# Patient Record
Sex: MALE | Race: BLACK OR AFRICAN AMERICAN | NOT HISPANIC OR LATINO | Employment: FULL TIME | URBAN - METROPOLITAN AREA
[De-identification: names, ages, dates, MRNs, and addresses within clinical notes are randomized per-mention and may not be internally consistent; named-entity substitution may affect disease eponyms.]

---

## 2017-01-18 DIAGNOSIS — M47.819 SPONDYLOARTHRITIS: Primary | ICD-10-CM

## 2017-01-18 DIAGNOSIS — H20.9 UVEITIS: ICD-10-CM

## 2017-01-18 DIAGNOSIS — Z79.899 HIGH RISK MEDICATION USE: ICD-10-CM

## 2017-01-18 NOTE — TELEPHONE ENCOUNTER
Humira pen      Last Written Prescription Date:  10/27/2016  Last Fill Quantity: 2 ea,   # refills: 2  Last Office Visit with [facility], [facility] or [facility] prescribing provider: 11/23/2016  Future Office visit:    Next 5 appointments (look out 90 days)     Feb 01, 2017  9:15 AM   Return Visit with Weston Delgado MD   [facility] ([address] [address]   [phone]                   Routing refill request to provider for review/approval because:  Drug not on the [facility], [facility] or [facility] refill protocol or controlled substance

## 2017-02-01 ENCOUNTER — TELEPHONE (OUTPATIENT)
Dept: RHEUMATOLOGY | Facility: CLINIC | Age: 27
End: 2017-02-01

## 2017-02-01 DIAGNOSIS — M47.819 SPONDYLOARTHRITIS: Primary | ICD-10-CM

## 2017-02-01 DIAGNOSIS — H20.9 UVEITIS: ICD-10-CM

## 2017-02-01 DIAGNOSIS — Z79.899 HIGH RISK MEDICATION USE: ICD-10-CM

## 2017-02-01 NOTE — TELEPHONE ENCOUNTER
Reason for Call:  Other prescription    Detailed comments: Patient called to report that he still hasn't received his medication SCHUYLER MCCLELLAND      Phone Number Patient can be reached at: Home number on file [phone] (home)    Best Time: as soon as possible    Can we leave a detailed message on this number? YES    Call taken on 2/1/2017 at 3:24 PM by Karen De La Paz

## 2017-08-10 ENCOUNTER — OFFICE VISIT (OUTPATIENT)
Dept: OPHTHALMOLOGY | Facility: CLINIC | Age: 27
End: 2017-08-10
Payer: MEDICAID

## 2017-08-10 DIAGNOSIS — H21.42: Primary | ICD-10-CM

## 2017-08-10 DIAGNOSIS — H20.9 IRITIS: ICD-10-CM

## 2017-08-10 PROCEDURE — 92012 INTRM OPH EXAM EST PATIENT: CPT | Performed by: OPHTHALMOLOGY

## 2017-08-10 RX ORDER — PREDNISOLONE ACETATE 10 MG/ML
1 SUSPENSION/ DROPS OPHTHALMIC
Qty: 1 BOTTLE | Refills: 6 | Status: SHIPPED | OUTPATIENT
Start: 2017-08-10 | End: 2017-09-01

## 2017-08-10 ASSESSMENT — EXTERNAL EXAM - LEFT EYE: OS_EXAM: NORMAL

## 2017-08-10 ASSESSMENT — SLIT LAMP EXAM - LIDS
COMMENTS: NORMAL
COMMENTS: NORMAL

## 2017-08-10 ASSESSMENT — EXTERNAL EXAM - RIGHT EYE: OD_EXAM: NORMAL

## 2017-08-10 ASSESSMENT — TONOMETRY
OD_IOP_MMHG: 09
OS_IOP_MMHG: 09
IOP_METHOD: APPLANATION

## 2017-08-10 ASSESSMENT — VISUAL ACUITY
OD_CC: 20/20
METHOD: SNELLEN - LINEAR
CORRECTION_TYPE: GLASSES

## 2017-08-10 NOTE — PATIENT INSTRUCTIONS
Prednisolone: one drop both eyes every 2 hours while awake.  Return visit Wed. @ 8:30 for PI laser left eye.     Weston Delgado M.D.

## 2017-08-10 NOTE — MR AVS SNAPSHOT
After Visit Summary   8/10/2017    Kirill Butt    MRN: [id number]           Patient Information     Date Of Birth          1990        Visit Information        Provider Department      8/10/2017 9:45 AM Weston Delgado MD [facility]        Today's Diagnoses     Iritis, ou          Care Instructions    Prednisolone: one drop both eyes every 2 hours while awake  Return visit Wed. @ 8:30 for PI laser left eye     Weston Delgado M.D.            Follow-ups after your visit        Your next 10 appointments already scheduled     Aug 15, 2017 11:00 AM CDT   New Visit with Jose Alejandro Butler MD   [facility]IC[facility] ([facility] [place])    [address]  [facility] [address] [phone]           Please inform patient of the clinic address: [facility] [address] , [address] [facility] [address]              Who to contact     If you have questions or need follow up information about today's clinic visit or your schedule please contact [facility] directly at [phone].  Normal or non-critical lab and imaging results will be communicated to you by MyChart, letter or phone within 4 business days after the clinic has received the results. If you do not hear from us within 7 days, please contact the clinic through Vitrinepixhart or phone. If you have a critical or abnormal lab result, we will notify you by phone as soon as possible.  Submit refill requests through Inaika or call your pharmacy and they will forward the refill request to us. Please allow 3 business days for your refill to be completed.          Additional Information About Your Visit        MyChart Information     Inaika gives you secure access to your electronic health record. If you see a primary care provider, you can also send messages to your care team and make appointments. If you have questions, please call  your primary care clinic.  If you do not have a primary care provider, please call [phone] and they will assist you.        Care EveryWhere ID     This is your Care EveryWhere ID. This could be used by other organizations to access your [facility] medical records  [id number]         Blood Pressure from Last 3 Encounters:   08/31/16 118/80   03/18/16 136/65   11/24/15 145/89    Weight from Last 3 Encounters:   08/31/16 71.4 kg (157 lb 6.4 oz)   03/18/16 75.8 kg (167 lb)   11/24/15 74.4 kg (164 lb)              Today, you had the following     No orders found for display       Primary Care Provider Office Phone # Fax #    Rosemarie Brooks [phone]       [address]  [facility] [address]        Equal Access to Services     [facility]: Hadii aad ku hadasho Soomaali, waaxda luqadaha, qaybta kaalmada adeegyada, erick garrett hayjas mckeon . So [facility] [phone].    ATENCIÓN: Si habla español, tiene a banks disposición servicios gratuitos de asistencia lingüística. Llame al [phone].    We comply with applicable federal civil rights laws and [place] laws. We do not discriminate on the basis of race, color, national origin, age, disability sex, sexual orientation or gender identity.            Thank you!     Thank you for choosing [facility]  for your care. Our goal is always to provide you with excellent care. Hearing back from our patients is one way we can continue to improve our services. Please take a few minutes to complete the written survey that you may receive in the mail after your visit with us. Thank you!             Your Updated Medication List - Protect others around you: Learn how to safely use, store and throw away your medicines at www.disposemymeds.org.          This list is accurate as of: 8/10/17 10:44 AM.  Always use your most recent med list.                   Brand Name Dispense Instructions for use Diagnosis    adalimumab 40 MG/0.8ML pen kit     HUMIRA PEN    2 each    Inject 0.8 mLs (40 mg) Subcutaneous every 14 days    Spondyloarthritis (H), High risk medication use, Uveitis       prednisoLONE acetate 1 % ophthalmic susp    PRED FORTE    1 Bottle    Place 1 drop into both eyes 4 times daily    Iritis

## 2017-08-10 NOTE — PROGRESS NOTES
Current Eye Medications:  None.       Subjective:  Patient complains of pain in each eye.  Starting intermittently for the past month, his left eye has been hurting.  The pain has gradually increased over the past week.  Now right eye is becoming red and painful.  Vision is blurred and both eyes are photophobic.     Off Humira.   Encouraged to resume; seeing rheumatology in [place] soon.     Objective:  See Ophthalmology Exam.       Assessment:  Iritis right eye in better seeing eye of patient with hx of chronic uveitis.  Iris bombe left eye.       Plan:  Prednisolone: one drop both eyes every 2 hours while awake.  Return visit Wed. @ 8:30 for PI laser left eye.     Weston Delgado M.D.

## 2017-08-12 PROBLEM — H21.42: Status: ACTIVE | Noted: 2017-08-12

## 2017-08-16 ENCOUNTER — OFFICE VISIT (OUTPATIENT)
Dept: OPHTHALMOLOGY | Facility: CLINIC | Age: 27
End: 2017-08-16
Payer: MEDICAID

## 2017-08-16 DIAGNOSIS — H21.42: Primary | ICD-10-CM

## 2017-08-16 PROCEDURE — 66761 REVISION OF IRIS: CPT | Mod: LT | Performed by: OPHTHALMOLOGY

## 2017-08-16 ASSESSMENT — TONOMETRY
OS_IOP_MMHG: 06
IOP_METHOD: APPLANATION

## 2017-08-16 ASSESSMENT — SLIT LAMP EXAM - LIDS
COMMENTS: NORMAL
COMMENTS: NORMAL

## 2017-08-16 ASSESSMENT — EXTERNAL EXAM - LEFT EYE: OS_EXAM: NORMAL

## 2017-08-16 ASSESSMENT — VISUAL ACUITY
METHOD: SNELLEN - LINEAR
OS_SC: HM BARELY

## 2017-08-16 ASSESSMENT — EXTERNAL EXAM - RIGHT EYE: OD_EXAM: NORMAL

## 2017-08-16 NOTE — MR AVS SNAPSHOT
After Visit Summary   8/16/2017    Kirill Butt    MRN: [id number]           Patient Information     Date Of Birth          1990        Visit Information        Provider Department      8/16/2017 8:30 AM Weston Delgado MD [facility]        Today's Diagnoses     Iris bombe of left eye    -  1      Care Instructions    Continue Prednisolone every 2 hours while awake both eyes     Weston Delgado M.D.                 Follow-ups after your visit        Additional Services     OPHTHALMOLOGY ADULT REFERRAL       Your provider has referred you to: [facility], Dr. Aburto   Please be aware that coverage of these services is subject to the terms and limitations of your health insurance plan.  Call member services at your health plan with any benefit or coverage questions.      Please bring the following to your appointment:  >>   Any x-rays, CTs or MRIs which have been performed.  Contact the facility where they were done to arrange for  prior to your scheduled appointment.  Any new CT, MRI or other procedures ordered by your specialist must be performed at a [facility] facility or coordinated by your clinic's referral office.    >>   List of current medications   >>   This referral request   >>   Any documents/labs given to you for this referral                  Your next 10 appointments already scheduled     Aug 23, 2017  9:15 AM CDT   [facility]    [address] [place] [address] [phone]            Sep 05, 2017 10:45 AM CDT   New Visit with Jose Alejandro Butler MD   [facility] ([facility] [place])    [address] [place] [address], [facility] [address]  [facility] [address] [phone]           Please inform patient of the clinic address: [facility] [place] [address], [address] [facility] [address]              Who  to contact     If you have questions or need follow up information about today's clinic visit or your schedule please contact [facility] directly at [phone].  Normal or non-critical lab and imaging results will be communicated to you by MyChart, letter or phone within 4 business days after the clinic has received the results. If you do not hear from us within 7 days, please contact the clinic through MyChart or phone. If you have a critical or abnormal lab result, we will notify you by phone as soon as possible.  Submit refill requests through Biart or call your pharmacy and they will forward the refill request to us. Please allow 3 business days for your refill to be completed.          Additional Information About Your Visit        Biart Information     Biart gives you secure access to your electronic health record. If you see a primary care provider, you can also send messages to your care team and make appointments. If you have questions, please call your primary care clinic.  If you do not have a primary care provider, please call [phone] and they will assist you.        Care EveryWhere ID     This is your Care EveryWhere ID. This could be used by other organizations to access your [facility] medical records  [id number]         Blood Pressure from Last 3 Encounters:   08/31/16 118/80   03/18/16 136/65   11/24/15 145/89    Weight from Last 3 Encounters:   08/31/16 71.4 kg (157 lb 6.4 oz)   03/18/16 75.8 kg (167 lb)   11/24/15 74.4 kg (164 lb)              We Performed the Following     LASER IRIDOTOMY     OPHTHALMOLOGY ADULT REFERRAL        Primary Care Provider Office Phone # Fax #    Rosemarie Brooks [phone]       [address]  [facility] [address]        Equal Access to Services     [place] DELEON : Reese Kenney, waluisa hull, erick jean. So [place] [phone].    ATENCIÓN: Isidra sequeira  español, tiene a banks disposición servicios gratuitos de asistencia lingüística. Mitra jimenes [phone].    We comply with applicable federal civil rights laws and [place] laws. We do not discriminate on the basis of race, color, national origin, age, disability sex, sexual orientation or gender identity.            Thank you!     Thank you for choosing [facility]  for your care. Our goal is always to provide you with excellent care. Hearing back from our patients is one way we can continue to improve our services. Please take a few minutes to complete the written survey that you may receive in the mail after your visit with us. Thank you!             Your Updated Medication List - Protect others around you: Learn how to safely use, store and throw away your medicines at www.disposemymeds.org.          This list is accurate as of: 8/16/17  9:22 AM.  Always use your most recent med list.                   Brand Name Dispense Instructions for use Diagnosis    adalimumab 40 MG/0.8ML pen kit    HUMIRA PEN    2 each    Inject 0.8 mLs (40 mg) Subcutaneous every 14 days    Spondyloarthritis (H), High risk medication use, Uveitis       * prednisoLONE acetate 1 % ophthalmic susp    PRED FORTE    1 Bottle    Place 1 drop into both eyes 4 times daily    Iritis       * prednisoLONE acetate 1 % ophthalmic susp    PRED FORTE    1 Bottle    Place 1 drop into both eyes every 2 hours (while awake)    Iritis       * Notice:  This list has 2 medication(s) that are the same as other medications prescribed for you. Read the directions carefully, and ask your doctor or other care provider to review them with you.

## 2017-08-16 NOTE — PROGRESS NOTES
Current Eye Medications:  Prednisolone every 2 hours while awake both eyes      Subjective:  Yag PI os   Consent signed.     Objective:  See Ophthalmology Exam.       Assessment:  Iris bombe left eye.      Plan: Yag PI left eye  performed without problems left eye under Proparacaine anesthetic.  Well tolerated.  Number of applications: 28/Double Burst  Power of applications: 7.3 mJ  Iopidine given.    Weston Delgado M.D.

## 2017-08-18 PROBLEM — Z98.890 HISTORY OF YAG LASER IRIDOTOMY OF LEFT EYE: Status: ACTIVE | Noted: 2017-08-18

## 2017-09-01 ENCOUNTER — OFFICE VISIT (OUTPATIENT)
Dept: OPHTHALMOLOGY | Facility: CLINIC | Age: 27
End: 2017-09-01
Payer: COMMERCIAL

## 2017-09-01 DIAGNOSIS — H30.20 INTERMEDIATE UVEITIS, UNSPECIFIED LATERALITY: ICD-10-CM

## 2017-09-01 DIAGNOSIS — H20.9 IRITIS: ICD-10-CM

## 2017-09-01 PROCEDURE — 92012 INTRM OPH EXAM EST PATIENT: CPT | Performed by: OPHTHALMOLOGY

## 2017-09-01 RX ORDER — ATROPINE SULFATE 10 MG/ML
1 SOLUTION/ DROPS OPHTHALMIC 3 TIMES DAILY
Qty: 5 ML | Refills: 4 | Status: SHIPPED | OUTPATIENT
Start: 2017-09-01 | End: 2017-11-20

## 2017-09-01 RX ORDER — PREDNISOLONE ACETATE 10 MG/ML
1 SUSPENSION/ DROPS OPHTHALMIC
Qty: 1 BOTTLE | Refills: 6 | Status: SHIPPED | OUTPATIENT
Start: 2017-09-01 | End: 2017-09-21

## 2017-09-01 RX ORDER — PREDNISONE 10 MG/1
20 TABLET ORAL 2 TIMES DAILY
Qty: 60 TABLET | Refills: 3 | Status: SHIPPED | OUTPATIENT
Start: 2017-09-01 | End: 2021-07-07

## 2017-09-01 ASSESSMENT — TONOMETRY
OS_IOP_MMHG: 12
OD_IOP_MMHG: 11
IOP_METHOD: APPLANATION

## 2017-09-01 ASSESSMENT — VISUAL ACUITY
OD_SC: 20/30
OD_SC+: -2
OS_SC: LP
METHOD: SNELLEN - LINEAR

## 2017-09-01 ASSESSMENT — SLIT LAMP EXAM - LIDS
COMMENTS: NORMAL
COMMENTS: NORMAL

## 2017-09-01 ASSESSMENT — EXTERNAL EXAM - LEFT EYE: OS_EXAM: NORMAL

## 2017-09-01 ASSESSMENT — EXTERNAL EXAM - RIGHT EYE: OD_EXAM: NORMAL

## 2017-09-01 NOTE — PATIENT INSTRUCTIONS
Continue Prednisolone four times daily right eye and every 2 hours left eye   Atropine left eye three times daily.  Prednisone 20 mg. Twice daily   Return visit 2 weeks for refraction right eye and intraocular pressure check both eyes.  Try to resume Humira with rheumatology.     Weston Delgado M.D.

## 2017-09-01 NOTE — MR AVS SNAPSHOT
After Visit Summary   9/1/2017    Kirill Butt    MRN: [id number]           Patient Information     Date Of Birth          1990        Visit Information        Provider Department      9/1/2017 10:15 AM Weston Delgado MD [facility]        Today's Diagnoses     Iritis, ou        Intermediate uveitis, unspecified laterality        Iritis          Care Instructions    Continue Prednisolone four times daily right eye and every 2 hours left eye   Atropine left eye three times daily  Prednisone 20 mg. Twice daily   Return visit 2 weeks for refraction right eye and intraocular pressure check both eyes.  Try to resume Humira with rheumatology.     Weston Delgado M.D.            Follow-ups after your visit        Your next 10 appointments already scheduled     Sep 05, 2017 10:45 AM CDT   New Visit with Jose Alejandro Butler MD   [facility] MEDICINE ([facility] [place])    [address] [place] [address] [phone]           Please inform patient of the clinic address: [facility] [address] , [place] [id number]              Who to contact     If you have questions or need follow up information about today's clinic visit or your schedule please contact [facility] directly at [phone].  Normal or non-critical lab and imaging results will be communicated to you by MyChart, letter or phone within 4 business days after the clinic has received the results. If you do not hear from us within 7 days, please contact the clinic through MyChart or phone. If you have a critical or abnormal lab result, we will notify you by phone as soon as possible.  Submit refill requests through Incomparable Things or call your pharmacy and they will forward the refill request to us. Please allow 3 business days for your refill to be completed.          Additional Information About Your Visit         TM3 Software Information     TM3 Software gives you secure access to your electronic health record. If you see a primary care provider, you can also send messages to your care team and make appointments. If you have questions, please call your primary care clinic.  If you do not have a primary care provider, please call [phone] and they will assist you.        Care EveryWhere ID     This is your Care EveryWhere ID. This could be used by other organizations to access your [facility] medical records  [id number]         Blood Pressure from Last 3 Encounters:   08/31/16 118/80   03/18/16 136/65   11/24/15 145/89    Weight from Last 3 Encounters:   08/31/16 71.4 kg (157 lb 6.4 oz)   03/18/16 75.8 kg (167 lb)   11/24/15 74.4 kg (164 lb)              Today, you had the following     No orders found for display         Today's Medication Changes          These changes are accurate as of: 9/1/17 11:03 AM.  If you have any questions, ask your nurse or doctor.               Start taking these medicines.        Dose/Directions    atropine 1 % ophthalmic solution   Used for:  Iritis        Dose:  1 drop   Place 1 drop Into the left eye 3 times daily   Quantity:  5 mL   Refills:  4       predniSONE 10 MG tablet   Commonly known as:  DELTASONE   Used for:  Intermediate uveitis, unspecified laterality        Dose:  20 mg   Take 2 tablets (20 mg) by mouth 2 times daily   Quantity:  60 tablet   Refills:  3            Where to get your medicines      These medications were sent to [id number] - [facility] [address] [facility] [address] [place] [facility]  [address], [facility] [address]     Phone:  [phone]     atropine 1 % ophthalmic solution    prednisoLONE acetate 1 % ophthalmic susp    predniSONE 10 MG tablet                Primary Care Provider Office Phone # Fax #    Rosemarie Brooks [phone] [address]  [facility] [address]        Equal Access to  Services     [facility]: Hadii aad ku hadbakarishanae Sangeethagraham, waaxda luqadaha, qaybta kaalmada wei, erick cmkeon . So [facility] [phone].    ATENCIÓN: Si fabby savage, tiene a banks disposición servicios gratuitos de asistencia lingüística. Llame al [phone].    We comply with applicable federal civil rights laws and [place] laws. We do not discriminate on the basis of race, color, national origin, age, disability sex, sexual orientation or gender identity.            Thank you!     Thank you for choosing [facility]  for your care. Our goal is always to provide you with excellent care. Hearing back from our patients is one way we can continue to improve our services. Please take a few minutes to complete the written survey that you may receive in the mail after your visit with us. Thank you!             Your Updated Medication List - Protect others around you: Learn how to safely use, store and throw away your medicines at www.disposemymeds.org.          This list is accurate as of: 9/1/17 11:03 AM.  Always use your most recent med list.                   Brand Name Dispense Instructions for use Diagnosis    adalimumab 40 MG/0.8ML pen kit    HUMIRA PEN    2 each    Inject 0.8 mLs (40 mg) Subcutaneous every 14 days    Spondyloarthritis (H), High risk medication use, Uveitis       atropine 1 % ophthalmic solution     5 mL    Place 1 drop Into the left eye 3 times daily    Iritis       prednisoLONE acetate 1 % ophthalmic susp    PRED FORTE    1 Bottle    Place 1 drop into both eyes every 2 hours (while awake)    Iritis       predniSONE 10 MG tablet    DELTASONE    60 tablet    Take 2 tablets (20 mg) by mouth 2 times daily    Intermediate uveitis, unspecified laterality

## 2017-09-01 NOTE — PROGRESS NOTES
Current Eye Medications:  Prednisolone 1% both eyes every 2 hours.       Subjective:  Status/Post Yag peripheral iridotomy, left eye: 8-16-17.  Left eye was feeling better until the past few days when his left eye has been progressively very photophobic, red, and painful.     Has appointment with rheumatology soon.      Objective:  See Ophthalmology Exam.       Assessment:  Increased inflammation left eye in patient with chronic iritis and recent Yag PI for iris bombe.      Plan:  Continue Prednisolone four times daily right eye and every 2 hours left eye   Atropine left eye three times daily.  Prednisone 20 mg. Twice daily   Return visit 2 weeks for refraction right eye and intraocular pressure check both eyes.  Try to resume Humira with rheumatology.     Weston Delgado M.D.

## 2017-09-05 ENCOUNTER — OFFICE VISIT (OUTPATIENT)
Dept: RHEUMATOLOGY | Facility: CLINIC | Age: 27
End: 2017-09-05
Payer: COMMERCIAL

## 2017-09-05 VITALS
HEART RATE: 68 BPM | BODY MASS INDEX: 24.12 KG/M2 | WEIGHT: 154 LBS | DIASTOLIC BLOOD PRESSURE: 72 MMHG | SYSTOLIC BLOOD PRESSURE: 136 MMHG

## 2017-09-05 DIAGNOSIS — Z79.899 HIGH RISK MEDICATION USE: ICD-10-CM

## 2017-09-05 DIAGNOSIS — H20.9 UVEITIS: ICD-10-CM

## 2017-09-05 DIAGNOSIS — M47.819 SPONDYLOARTHRITIS: ICD-10-CM

## 2017-09-05 PROCEDURE — 99214 OFFICE O/P EST MOD 30 MIN: CPT | Performed by: INTERNAL MEDICINE

## 2017-09-05 NOTE — NURSING NOTE
"Chief Complaint   Patient presents with     Consult     Dr. Rodriguez Rheum. RA       Initial /72  Pulse 68  Wt 69.9 kg (154 lb)  BMI 24.12 kg/m2 Estimated body mass index is 24.12 kg/(m^2) as calculated from the following:    Height as of 8/31/16: 1.702 m (5' 7\").    Weight as of this encounter: 69.9 kg (154 lb).      Patient prefers to be contacted via at Home.   Okay to leave detailed message: Yes  Patient uses MyChart: Yes    Marleny AHUJA LPN    "

## 2017-09-05 NOTE — PROGRESS NOTES
PRIMARY CARE PHYSICIAN:  Dr. Rosemarie Brooks.       SUBJECTIVE:  Kirill Butt is seen to establish care for treatment of what appears to be spondyloarthropathy with iritis; was seen by Dr. Rodriguez on several occasions and was on Humira and prior to that was seen by Dr. Lee.  He apparently lost his insurance about 6 months ago and has only just regained it; hence has not been on Humira for 5 months.  He has been having recurrent attacks of iritis with recurrent attacks of pain and swelling in his right knee.  He has had this problem since age 7 and the knee has been drained on multiple occasions.      He is also worried about STDs and says that he is usually screen for that every year and is due again.      He is not having any chest pain or shortness of breath.  He denies having dysuria or hematuria.  There is no discomfort with urination.  There is no chronic diarrhea or blood in his stool.      PAST MEDICAL HISTORY:  Spinal arthritis, pseudophakia, uveitis, history vitrectomy.      MEDICATIONS:  Pred Forte.      DRUG ALLERGIES:  None.      SOCIAL HISTORY:  Works in the [employer]; is not a smoker or drinker.      FAMILY HISTORY:  Nobody in his family with similar problems.      PHYSICAL EXAMINATION:   VITAL SIGNS:  Blood pressure 136/72, pulse 68.     HEENT:  He is normocephalic and atraumatic.  Sclerae are clear and moist.  Oropharynx without lesions.   NECK:  Supple without lymphadenopathy.   LUNGS:  Clear.   HEART:  Regular without murmur.   JOINT EXAM:  No obvious synovitis, erythema of the wrists, MCPs, or PIPs.  There is mild deformity of the right third finger, perhaps of the right wrist.  No synovitis of the elbows, shoulders, knees or ankles.   SKIN:  There are no obvious lesions today.  Sclerae are clear today as well.      IMPRESSION:     1.  Recurrent iritis.   2.  Spondyloarthropathy syndrome that is probably psoriatic arthritis.      RECOMMENDATIONS:   1.  Get back on the Humira.   2.  We will  check STD labs today at his request.   3.  Follow up with me in 2-3 months to assess response to therapy.         RODRI SANCHEZ MD             D: 2017 12:38   T: 2017 12:52   MT: FRANCIA#[id number]      Name:     NADIR MCGOVERN   MRN:      [id number]        Account:      [id number]   :      1990           Visit Date:   2017      Document: [id number]       cc: Rosemarie Brooks MD

## 2017-09-05 NOTE — MR AVS SNAPSHOT
After Visit Summary   9/5/2017    Kirill Butt    MRN: [id number]           Patient Information     Date Of Birth          1990        Visit Information        Provider Department      9/5/2017 10:45 AM Jose Alejandro Butler MD [facility], [place]        Today's Diagnoses     Spondyloarthritis (H)        High risk medication use        Uveitis           Follow-ups after your visit        Follow-up notes from your care team     Return in about 2 months (around 11/5/2017).      Future tests that were ordered for you today     Open Future Orders        Priority Expected Expires Ordered    Neisseria gonorrhoeae PCR Routine  9/29/2017 9/5/2017    **HIV Antigen Antibody Combo FUTURE anytime Routine 9/5/2017 9/5/2018 9/5/2017    Anti Treponema Routine  9/5/2018 9/5/2017            Who to contact     If you have questions or need follow up information about today's clinic visit or your schedule please contact [facility], [place] directly at [phone].  Normal or non-critical lab and imaging results will be communicated to you by [employer], letter or phone within 4 business days after the clinic has received the results. If you do not hear from us within 7 days, please contact the clinic through [facility] or phone. If you have a critical or abnormal lab result, we will notify you by phone as soon as possible.  Submit refill requests through [facility] or call your pharmacy and they will forward the refill request to us. Please allow 3 business days for your refill to be completed.          Additional Information About Your Visit        [employer] Information     [facility] gives you secure access to your electronic health record. If you see a primary care provider, you can also send messages to your care team and make appointments. If you have questions, please call your primary care clinic.  If you do not have a primary care provider, please call [phone] and they will assist you.         Care EveryWhere ID     This is your Care EveryWhere ID. This could be used by other organizations to access your [facility] medical records  [id number]        Your Vitals Were     Pulse BMI (Body Mass Index)                68 24.12 kg/m2           Blood Pressure from Last 3 Encounters:   09/05/17 136/72   08/31/16 118/80   03/18/16 136/65    Weight from Last 3 Encounters:   09/05/17 69.9 kg (154 lb)   08/31/16 71.4 kg (157 lb 6.4 oz)   03/18/16 75.8 kg (167 lb)              We Performed the Following     Chlamydia trachomatis PCR          Today's Medication Changes          These changes are accurate as of: 9/5/17 10:58 AM.  If you have any questions, ask your nurse or doctor.               Start taking these medicines.        Dose/Directions    adalimumab 40 MG/0.8ML pen kit   Commonly known as:  HUMIRA PEN   Used for:  Spondyloarthritis (H), High risk medication use, Uveitis        Dose:  40 mg   Inject 0.8 mLs (40 mg) Subcutaneous every 14 days   Quantity:  2 each   Refills:  2            Where to get your medicines      These medications were sent to [facility] - [place], [place] - [address]  [address] [facility] [address]    Hours:  Mon-Fri 8:30am-5:00pm Toll Free [phone] Phone:  [phone]     adalimumab 40 MG/0.8ML pen kit                Primary Care Provider Office Phone # Fax #    Rosemarie Brooks [phone] [address]  [facility] [address]        Equal Access to Services     [facility]: Hadkarlos ashton Sograham, waaxda luqadaha, qaybta kaalmada erick carvajal . So [facility] [phone].    ATENCIÓN: Si habla español, tiene a banks disposición servicios gratuitos de asistencia lingüística. Llame al [phone].    We comply with applicable federal civil rights laws and [place] laws. We do not discriminate on the basis of race, color, national origin, age, disability sex, sexual  orientation or gender identity.            Thank you!     Thank you for choosing [facility]  for your care. Our goal is always to provide you with excellent care. Hearing back from our patients is one way we can continue to improve our services. Please take a few minutes to complete the written survey that you may receive in the mail after your visit with us. Thank you!             Your Updated Medication List - Protect others around you: Learn how to safely use, store and throw away your medicines at www.disposemymeds.org.          This list is accurate as of: 9/5/17 10:58 AM.  Always use your most recent med list.                   Brand Name Dispense Instructions for use Diagnosis    adalimumab 40 MG/0.8ML pen kit    HUMIRA PEN    2 each    Inject 0.8 mLs (40 mg) Subcutaneous every 14 days    Spondyloarthritis (H), High risk medication use, Uveitis       atropine 1 % ophthalmic solution     5 mL    Place 1 drop Into the left eye 3 times daily    Iritis       prednisoLONE acetate 1 % ophthalmic susp    PRED FORTE    1 Bottle    Place 1 drop into both eyes every 2 hours (while awake)    Iritis       predniSONE 10 MG tablet    DELTASONE    60 tablet    Take 2 tablets (20 mg) by mouth 2 times daily    Intermediate uveitis, unspecified laterality

## 2017-09-16 ENCOUNTER — HOSPITAL ENCOUNTER (EMERGENCY)
Facility: CLINIC | Age: 27
Discharge: HOME | End: 2017-09-16
Attending: INTERNAL MEDICINE | Admitting: INTERNAL MEDICINE
Payer: COMMERCIAL

## 2017-09-16 ENCOUNTER — APPOINTMENT (OUTPATIENT)
Dept: CT IMAGING | Facility: CLINIC | Age: 27
End: 2017-09-16
Attending: INTERNAL MEDICINE
Payer: COMMERCIAL

## 2017-09-16 VITALS
SYSTOLIC BLOOD PRESSURE: 121 MMHG | BODY MASS INDEX: 23.59 KG/M2 | DIASTOLIC BLOOD PRESSURE: 70 MMHG | OXYGEN SATURATION: 99 % | TEMPERATURE: 97.8 F | HEART RATE: 87 BPM | WEIGHT: 150.6 LBS | RESPIRATION RATE: 18 BRPM

## 2017-09-16 DIAGNOSIS — R51.9 SEVERE HEADACHE: ICD-10-CM

## 2017-09-16 DIAGNOSIS — R19.7 DIARRHEA, UNSPECIFIED TYPE: ICD-10-CM

## 2017-09-16 LAB
ALBUMIN SERPL-MCNC: 3.6 G/DL (ref 3.4–5)
ALP SERPL-CCNC: 87 U/L (ref 40–150)
ALT SERPL W P-5'-P-CCNC: 18 U/L (ref 0–70)
ANION GAP SERPL CALCULATED.3IONS-SCNC: 8 MMOL/L (ref 3–14)
APPEARANCE CSF: CLEAR
AST SERPL W P-5'-P-CCNC: 10 U/L (ref 0–45)
BASOPHILS # BLD AUTO: 0 10E9/L (ref 0–0.2)
BASOPHILS NFR BLD AUTO: 0.1 %
BILIRUB SERPL-MCNC: 0.3 MG/DL (ref 0.2–1.3)
BUN SERPL-MCNC: 20 MG/DL (ref 7–30)
CALCIUM SERPL-MCNC: 9.2 MG/DL (ref 8.5–10.1)
CHLORIDE SERPL-SCNC: 104 MMOL/L (ref 94–109)
CO2 SERPL-SCNC: 27 MMOL/L (ref 20–32)
COLOR CSF: COLORLESS
CREAT SERPL-MCNC: 0.87 MG/DL (ref 0.66–1.25)
CRP SERPL-MCNC: 16 MG/L (ref 0–8)
DIFFERENTIAL METHOD BLD: ABNORMAL
EOSINOPHIL # BLD AUTO: 0 10E9/L (ref 0–0.7)
EOSINOPHIL NFR BLD AUTO: 0 %
ERYTHROCYTE [DISTWIDTH] IN BLOOD BY AUTOMATED COUNT: 15.4 % (ref 10–15)
ERYTHROCYTE [SEDIMENTATION RATE] IN BLOOD BY WESTERGREN METHOD: 23 MM/H (ref 0–15)
GFR SERPL CREATININE-BSD FRML MDRD: >90 ML/MIN/1.7M2
GLUCOSE CSF-MCNC: 66 MG/DL (ref 40–70)
GLUCOSE SERPL-MCNC: 82 MG/DL (ref 70–99)
GRAM STN SPEC: NORMAL
GRAM STN SPEC: NORMAL
HCT VFR BLD AUTO: 39.2 % (ref 40–53)
HGB BLD-MCNC: 12.5 G/DL (ref 13.3–17.7)
IMM GRANULOCYTES # BLD: 0 10E9/L (ref 0–0.4)
IMM GRANULOCYTES NFR BLD: 0.3 %
INR PPP: 1 (ref 0.86–1.14)
LACTATE BLD-SCNC: 1.3 MMOL/L (ref 0.7–2)
LYMPHOCYTES # BLD AUTO: 1.7 10E9/L (ref 0.8–5.3)
LYMPHOCYTES NFR BLD AUTO: 10.8 %
MAGNESIUM SERPL-MCNC: 1.7 MG/DL (ref 1.6–2.3)
MCH RBC QN AUTO: 26.4 PG (ref 26.5–33)
MCHC RBC AUTO-ENTMCNC: 31.9 G/DL (ref 31.5–36.5)
MCV RBC AUTO: 83 FL (ref 78–100)
MONOCYTES # BLD AUTO: 0.8 10E9/L (ref 0–1.3)
MONOCYTES NFR BLD AUTO: 4.7 %
NEUTROPHILS # BLD AUTO: 13.3 10E9/L (ref 1.6–8.3)
NEUTROPHILS NFR BLD AUTO: 84.1 %
NRBC # BLD AUTO: 0 10*3/UL
NRBC BLD AUTO-RTO: 0 /100
PLATELET # BLD AUTO: 195 10E9/L (ref 150–450)
POTASSIUM SERPL-SCNC: 3.7 MMOL/L (ref 3.4–5.3)
PROT CSF-MCNC: 31 MG/DL (ref 15–60)
PROT SERPL-MCNC: 8.3 G/DL (ref 6.8–8.8)
RBC # BLD AUTO: 4.74 10E12/L (ref 4.4–5.9)
RBC # CSF MANUAL: 0 /UL (ref 0–2)
SODIUM SERPL-SCNC: 138 MMOL/L (ref 133–144)
SPECIMEN SOURCE: NORMAL
TUBE # CSF: 4 #
WBC # BLD AUTO: 15.8 10E9/L (ref 4–11)
WBC # CSF MANUAL: 0 /UL (ref 0–5)

## 2017-09-16 PROCEDURE — 85610 PROTHROMBIN TIME: CPT | Performed by: INTERNAL MEDICINE

## 2017-09-16 PROCEDURE — 87529 HSV DNA AMP PROBE: CPT | Performed by: INTERNAL MEDICINE

## 2017-09-16 PROCEDURE — 96361 HYDRATE IV INFUSION ADD-ON: CPT | Mod: 59

## 2017-09-16 PROCEDURE — 85652 RBC SED RATE AUTOMATED: CPT | Performed by: INTERNAL MEDICINE

## 2017-09-16 PROCEDURE — 82945 GLUCOSE OTHER FLUID: CPT | Performed by: INTERNAL MEDICINE

## 2017-09-16 PROCEDURE — 62270 DX LMBR SPI PNXR: CPT

## 2017-09-16 PROCEDURE — 99285 EMERGENCY DEPT VISIT HI MDM: CPT | Mod: 25

## 2017-09-16 PROCEDURE — 87070 CULTURE OTHR SPECIMN AEROBIC: CPT | Performed by: INTERNAL MEDICINE

## 2017-09-16 PROCEDURE — 87205 SMEAR GRAM STAIN: CPT | Performed by: INTERNAL MEDICINE

## 2017-09-16 PROCEDURE — 83605 ASSAY OF LACTIC ACID: CPT | Performed by: INTERNAL MEDICINE

## 2017-09-16 PROCEDURE — 96374 THER/PROPH/DIAG INJ IV PUSH: CPT

## 2017-09-16 PROCEDURE — 96375 TX/PRO/DX INJ NEW DRUG ADDON: CPT

## 2017-09-16 PROCEDURE — 70496 CT ANGIOGRAPHY HEAD: CPT

## 2017-09-16 PROCEDURE — 25000128 H RX IP 250 OP 636: Performed by: STUDENT IN AN ORGANIZED HEALTH CARE EDUCATION/TRAINING PROGRAM

## 2017-09-16 PROCEDURE — 89050 BODY FLUID CELL COUNT: CPT | Performed by: INTERNAL MEDICINE

## 2017-09-16 PROCEDURE — 62270 DX LMBR SPI PNXR: CPT | Mod: Z6 | Performed by: INTERNAL MEDICINE

## 2017-09-16 PROCEDURE — 99285 EMERGENCY DEPT VISIT HI MDM: CPT | Mod: 25 | Performed by: INTERNAL MEDICINE

## 2017-09-16 PROCEDURE — 87498 ENTEROVIRUS PROBE&REVRS TRNS: CPT | Performed by: INTERNAL MEDICINE

## 2017-09-16 PROCEDURE — 85025 COMPLETE CBC W/AUTO DIFF WBC: CPT | Performed by: INTERNAL MEDICINE

## 2017-09-16 PROCEDURE — 25000128 H RX IP 250 OP 636: Performed by: INTERNAL MEDICINE

## 2017-09-16 PROCEDURE — 86140 C-REACTIVE PROTEIN: CPT | Performed by: INTERNAL MEDICINE

## 2017-09-16 PROCEDURE — 84157 ASSAY OF PROTEIN OTHER: CPT | Performed by: INTERNAL MEDICINE

## 2017-09-16 PROCEDURE — 83735 ASSAY OF MAGNESIUM: CPT | Performed by: INTERNAL MEDICINE

## 2017-09-16 PROCEDURE — 80053 COMPREHEN METABOLIC PANEL: CPT | Performed by: INTERNAL MEDICINE

## 2017-09-16 RX ORDER — HYDROMORPHONE HYDROCHLORIDE 1 MG/ML
0.5 INJECTION, SOLUTION INTRAMUSCULAR; INTRAVENOUS; SUBCUTANEOUS
Status: DISCONTINUED | OUTPATIENT
Start: 2017-09-16 | End: 2017-09-17 | Stop reason: HOSPADM

## 2017-09-16 RX ORDER — NAPROXEN 500 MG/1
500 TABLET ORAL 2 TIMES DAILY PRN
Qty: 24 TABLET | Refills: 0 | Status: SHIPPED | OUTPATIENT
Start: 2017-09-16 | End: 2017-09-24

## 2017-09-16 RX ORDER — SODIUM CHLORIDE 9 MG/ML
1000 INJECTION, SOLUTION INTRAVENOUS CONTINUOUS
Status: DISCONTINUED | OUTPATIENT
Start: 2017-09-16 | End: 2017-09-17 | Stop reason: HOSPADM

## 2017-09-16 RX ORDER — IOPAMIDOL 755 MG/ML
75 INJECTION, SOLUTION INTRAVASCULAR ONCE
Status: COMPLETED | OUTPATIENT
Start: 2017-09-16 | End: 2017-09-16

## 2017-09-16 RX ORDER — ONDANSETRON 2 MG/ML
4 INJECTION INTRAMUSCULAR; INTRAVENOUS ONCE
Status: COMPLETED | OUTPATIENT
Start: 2017-09-16 | End: 2017-09-16

## 2017-09-16 RX ADMIN — HYDROMORPHONE HYDROCHLORIDE 0.5 MG: 1 INJECTION, SOLUTION INTRAMUSCULAR; INTRAVENOUS; SUBCUTANEOUS at 18:17

## 2017-09-16 RX ADMIN — ONDANSETRON 4 MG: 2 INJECTION INTRAMUSCULAR; INTRAVENOUS at 17:50

## 2017-09-16 RX ADMIN — IOPAMIDOL 75 ML: 755 INJECTION, SOLUTION INTRAVENOUS at 18:46

## 2017-09-16 RX ADMIN — SODIUM CHLORIDE 1000 ML: 9 INJECTION, SOLUTION INTRAVENOUS at 17:49

## 2017-09-16 ASSESSMENT — ENCOUNTER SYMPTOMS
WHEEZING: 0
NUMBNESS: 0
CHILLS: 0
NECK PAIN: 0
ABDOMINAL PAIN: 0
FEVER: 0
SORE THROAT: 0
WEAKNESS: 0
SHORTNESS OF BREATH: 0
CONFUSION: 0
HEADACHES: 1
EYE REDNESS: 1
NAUSEA: 1
ADENOPATHY: 0
VOMITING: 0
BACK PAIN: 0
LIGHT-HEADEDNESS: 0
DIFFICULTY URINATING: 0
COUGH: 0
DIARRHEA: 1

## 2017-09-16 NOTE — ED AVS SNAPSHOT
[facility], [place], Emergency Department    [address] [place] [address]    Phone:  [phone]                                       Kirill Butt   MRN: [id number]    Department:  [facility], Emergency Department   Date of Visit:  9/16/2017           After Visit Summary Signature Page     I have received my discharge instructions, and my questions have been answered. I have discussed any challenges I see with this plan with the nurse or doctor.    ..........................................................................................................................................  Patient/Patient Representative Signature      ..........................................................................................................................................  Patient Representative Print Name and Relationship to Patient    ..................................................               ................................................  Date                                            Time    ..........................................................................................................................................  Reviewed by Signature/Title    ...................................................              ..............................................  Date                                                            Time

## 2017-09-16 NOTE — ED PROVIDER NOTES
History     Chief Complaint   Patient presents with     Headache     HPI  Kirill Butt is a 27 year old male who presents with a throbbing occipital headache which awakened him from sleep last night at 0030 and has persisted throughout the day. 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 He has no fever, chills, sweats, neck pain or stiffness. He has no visual changes or eye pain. He has no numbness, weakness or difficulty with movement or speech. He has had 3 episodes of diarrhea and nausea. He has not had a similar headache in the past. He has no chest pain, cough, sputum, or shortness of breath. He has no joint pain or swelling.    PAST MEDICAL HISTORY:   Past Medical History:   Diagnosis Date     Ankylosing spondylitis (H)      Arthritis 10/3/2012     CARDIOVASCULAR SCREENING; LDL GOAL LESS THAN 160 9/11/2012     Cataract, mod, os 1/15/2015     Inflammatory spondylopathy (H) 2/1/2013     Iritis      Tear meniscus knee 10/17/2014     Traction detachment of retina, left eye        PAST SURGICAL HISTORY:   Past Surgical History:   Procedure Laterality Date     ARTHROSCOPY KNEE RT/LT Right 10/2014     ARTHROSCOPY KNEE WITH MEDIAL MENISCECTOMY Right 7/7/2015    Procedure: ARTHROSCOPY KNEE WITH MEDIAL MENISCECTOMY;  Surgeon: Marquis Degroot MD;  Location: [facility]     IRIDECTOMY Left 08/16/2017    left eye     PHACOEMULSIFICATION WITH STANDARD INTRAOCULAR LENS IMPLANT Left 6/2015     VITRECTOMY PARSPLANA Left 10/2015    retinectomy, macular TRD repair (JJ)       FAMILY HISTORY:   Family History   Problem Relation Age of Onset     Hypertension Maternal Grandfather      Aneurysm Father      Brain     Hypertension Mother      Hypertension Maternal Grandmother      Aneurysm Mother      Brain     Aneurysm Maternal Grandfather      Brain        SOCIAL HISTORY:   Social History   Substance Use Topics     Smoking status: Never Smoker     Smokeless tobacco: Never Used      Alcohol use No         I have reviewed the Medications, Allergies, Past Medical and Surgical History, and Social History in the Epic system.    Review of Systems   Constitutional: Negative for chills and fever.   HENT: Negative for congestion and sore throat.    Eyes: Positive for redness (chronic). Negative for visual disturbance.   Respiratory: Negative for cough, shortness of breath and wheezing.    Cardiovascular: Negative for chest pain.   Gastrointestinal: Positive for diarrhea and nausea. Negative for abdominal pain and vomiting.   Genitourinary: Negative for difficulty urinating.   Musculoskeletal: Negative for back pain and neck pain.   Skin: Negative for rash.   Neurological: Positive for headaches. Negative for weakness, light-headedness and numbness.   Hematological: Negative for adenopathy.   Psychiatric/Behavioral: Negative for confusion.       Physical Exam   BP: 124/84  Pulse: 78  Temp: 97.8  F (36.6  C)  Weight: 68.3 kg (150 lb 9.6 oz)  SpO2: 92 %  Physical Exam   Constitutional: He is oriented to person, place, and time. He appears well-developed and well-nourished. No distress.   HENT:   Head: Normocephalic and atraumatic.   Right Ear: External ear normal.   Left Ear: External ear normal.   Nose: Nose normal.   Mouth/Throat: Oropharynx is clear and moist. No oropharyngeal exudate.   Eyes: EOM are normal. Pupils are equal, round, and reactive to light. Left conjunctiva is injected.   Neck: Normal range of motion. Neck supple. No JVD present.   Cardiovascular: Normal rate, regular rhythm and normal heart sounds.  Exam reveals no friction rub.    No murmur heard.  Pulmonary/Chest: Effort normal and breath sounds normal. He has no wheezes. He has no rales.   Abdominal: Soft. Bowel sounds are normal. There is no tenderness. There is no rebound and no guarding.   Musculoskeletal: He exhibits no edema or tenderness.   Lymphadenopathy:     He has no cervical adenopathy.   Neurological: He is alert and  oriented to person, place, and time. He displays normal reflexes. No cranial nerve deficit. Coordination normal.   Skin: Skin is warm and dry. No rash noted.   Psychiatric: He has a normal mood and affect. His behavior is normal.   Nursing note and vitals reviewed.      ED Course     ED Course     Procedures        Labs/Imaging    Results for orders placed or performed during the hospital encounter of 09/16/17 (from the past 24 hour(s))   CBC with platelets differential   Result Value Ref Range    WBC 15.8 (H) 4.0 - 11.0 10e9/L    RBC Count 4.74 4.4 - 5.9 10e12/L    Hemoglobin 12.5 (L) 13.3 - 17.7 g/dL    Hematocrit 39.2 (L) 40.0 - 53.0 %    MCV 83 78 - 100 fl    MCH 26.4 (L) 26.5 - 33.0 pg    MCHC 31.9 31.5 - 36.5 g/dL    RDW 15.4 (H) 10.0 - 15.0 %    Platelet Count 195 150 - 450 10e9/L    Diff Method Automated Method     % Neutrophils 84.1 %    % Lymphocytes 10.8 %    % Monocytes 4.7 %    % Eosinophils 0.0 %    % Basophils 0.1 %    % Immature Granulocytes 0.3 %    Nucleated RBCs 0 0 /100    Absolute Neutrophil 13.3 (H) 1.6 - 8.3 10e9/L    Absolute Lymphocytes 1.7 0.8 - 5.3 10e9/L    Absolute Monocytes 0.8 0.0 - 1.3 10e9/L    Absolute Eosinophils 0.0 0.0 - 0.7 10e9/L    Absolute Basophils 0.0 0.0 - 0.2 10e9/L    Abs Immature Granulocytes 0.0 0 - 0.4 10e9/L    Absolute Nucleated RBC 0.0    Comprehensive metabolic panel   Result Value Ref Range    Sodium 138 133 - 144 mmol/L    Potassium 3.7 3.4 - 5.3 mmol/L    Chloride 104 94 - 109 mmol/L    Carbon Dioxide 27 20 - 32 mmol/L    Anion Gap 8 3 - 14 mmol/L    Glucose 82 70 - 99 mg/dL    Urea Nitrogen 20 7 - 30 mg/dL    Creatinine 0.87 0.66 - 1.25 mg/dL    GFR Estimate >90 >60 mL/min/1.7m2    GFR Estimate If Black >90 >60 mL/min/1.7m2    Calcium 9.2 8.5 - 10.1 mg/dL    Bilirubin Total 0.3 0.2 - 1.3 mg/dL    Albumin 3.6 3.4 - 5.0 g/dL    Protein Total 8.3 6.8 - 8.8 g/dL    Alkaline Phosphatase 87 40 - 150 U/L    ALT 18 0 - 70 U/L    AST 10 0 - 45 U/L   CRP inflammation    Result Value Ref Range    CRP Inflammation 16.0 (H) 0.0 - 8.0 mg/L   Erythrocyte sedimentation rate auto   Result Value Ref Range    Sed Rate 23 (H) 0 - 15 mm/h   INR   Result Value Ref Range    INR 1.00 0.86 - 1.14   Magnesium   Result Value Ref Range    Magnesium 1.7 1.6 - 2.3 mg/dL   Lactic acid   Result Value Ref Range    Lactic Acid 1.3 0.7 - 2.0 mmol/L   CT Head Angio w/o & w Contrast    Narrative    CTA ANGIOGRAM HEAD 9/16/2017 7:01 PM    Head CT without contrast  CT angiogram of the neck   CT angiogram of the base of the brain with contrast  Reconstruction by the Radiologist on the 3D workstation    Provided History:  severe occipital headache family history of  aneurisms    Comparison:  MRI 9/12/2012.      Technique:  HEAD CT:  Using multidetector thin collimation helical acquisition  technique, axial, coronal and sagittal CT images from the skull base  to the vertex were obtained without intravenous contrast.   HEAD and NECK CTA: During rapid bolus intravenous injection of  nonionic contrast material, axial images were obtained using thin  collimation multidetector helical technique from the base of the skull  through the [place]. This CT angiogram data was reconstructed  at thin intervals with mild overlap. Images were sent to the [employer]a  workstation, and 3D reconstructions were obtained. The axial source  images, multiplanar reformations, 3D reconstructions in both maximum  intensity projection display and volume rendered models were reviewed,  with reconstructions performed by the technologist and the  radiologist.    Contrast: 75ml     Findings:  Head CT: There is no evidence of intracranial hemorrhage, mass effect,  or midline shift. Gray/white matter differentiation in both cerebral  hemispheres is preserved. Ventricles are proportionate to the cerebral  sulci.     Paranasal sinuses and mastoid air cells are clear.    Postsurgical changes of left vitrectomy.    Head CTA demonstrates  no aneurysm or stenosis of the major  intracranial arteries. The anterior communicating artery is patent.  The posterior communicating arteries are patent bilaterally.     Neck CTA demonstrates no stenosis of the major cervical arteries. The  origins of the great vessels from the aortic arch are patent.    No mass within the visualized portions of the cervical soft tissues or  lung apices.       Impression    Impression:    1. Head CTA demonstrates no aneurysm or stenosis of the major  intracranial arteries.   2. Neck CTA demonstrates no stenosis of the major cervical arteries.   3. No intracranial hemorrhage on the noncontrast head CT.  4. Post surgical changes of left vitrectomy.    I have personally reviewed the examination and initial interpretation  and I agree with the findings.    AMARA LOVE MD   Gram stain   Result Value Ref Range    Specimen Description Cerebrospinal fluid TUBE 3     Gram Stain No organisms seen     Gram Stain No  WBC'S seen      CSF Culture Aerobic Bacterial   Result Value Ref Range    Specimen Description Cerebrospinal fluid TUBE 3     Culture Micro PENDING    Cell count with differential CSF: Tube 4   Result Value Ref Range    WBC CSF 0 0 - 5 /uL    RBC CSF 0 0 - 2 /uL    Tube Number 4 #    Color CSF Colorless CLRL^Colorless    Appearance CSF Clear CLER^Clear     Discussed results of the labs and head CTA with the patient and his family. I discussed that the elevated WBC may be due to prednisone, but that infection remains a concern with this severe headache. Discussed risks of LP including post LP headache, infection, nerve injury, bleeding. He is agreeable to testing.    [facility] Procedure Note          Lumbar Puncture:      Time: 8:41 PM  Performed by: PRABHU TEIXEIRA  Authorized by: PRABHU TEIXEIRA    Indications: headache    Consent given by: Patient who states understanding of the procedure being performed after discussing the risks, benefits and  alternatives.    Prior to the start of the procedure and with procedural staff participation, I verbally confirmed the patient s identity using two indicators, relevant allergies, that the procedure was appropriate and matched the consent or emergent situation, and that the correct equipment/implants were available. Immediately prior to starting the procedure I conducted the Time Out with the procedural staff and re-confirmed the patient s name, procedure, and site/side. (The Joint Commission universal protocol was followed.) Yes    Under sterile conditions the patient was positioned Sitting, bent forward. Betadine solution and sterile drapes were utilized.  Local anesthetic at the site: 2 ml of lidocaine 1% without epinephrine from the LP tray  A 21 G  spinal needle was inserted at the L 3-4 interspace.  Opening Pressurewas not checked.  A total of 7mL of clear and colorless spinal fluid was obtained and sent to the laboratory.   After the needle was removed, a bandaid and pressure were applied and the patient was instructed to stay horizontal until the results were back.    Complications:  None    Patient tolerance: Patient tolerated the procedure well with no immediate complications.    2225: Headache resolved. No further nausea or diarrhea.    Assessments & Plan (with Medical Decision Making)   Impression:  Young man with a history of recurrent iritis and rheumatoid positive arthropathy. He is currently on oral prednisone for a flare up of iritis. He presented with throbbing occipital headache which awakened him from sleep, mild diarrhea this morning (resolved) and nausea. He has been having some neck aches in the mornings depending on how he sleeps. He has known cervical arthropathy. He has not had similar headaches in the past. He has a family history of intracranial aneurisms. He had a negative MR angiogram in 2012. Today he has a normal head CT and CT angiogram. He has an elevated WBC. He has been on a  prednisone burst for a couple of weeks. Since infectious process could not be excluded based on labs and exam he consented to LP. This had 0 WBC and 0 RBC's and negative gram stain. Thus meningitis, encephalitis and hemorrhage are extremely unlikely. The cause of his headache is not entirely clear. With the associated GI symptoms viral syndrome, atypical migraine are possible as is headache related to cervical arthritis. Other than the elevated WBC, labs are fairly unremarkable. Inflammatory markers are mildly elevated. Electrolytes and LFT are normal. He was treated with IV fluids, zofran and a single dose of 0.5 mg dilaudid with full resolution of the symptoms.      I have reviewed the nursing notes.    I have reviewed the findings, diagnosis, plan and need for follow up with the patient.    New Prescriptions    NAPROXEN (NAPROSYN) 500 MG TABLET    Take 1 tablet (500 mg) by mouth 2 times daily as needed for moderate pain       Final diagnoses:   Severe headache   Diarrhea, unspecified type       9/16/2017   [facility], [place], EMERGENCY DEPARTMENT     Ovidio Jaimes MD  09/16/17 9705

## 2017-09-16 NOTE — ED NOTES
Patient arrived for a headache that woke him up during the night. He reports diarrhea and pressure in his head associated with this headache. His parents have a history of brain aneurysms. He is alert, oriented, ambulatory. He says this has never happened to him before.

## 2017-09-16 NOTE — LETTER
To Whom it may concern:      Kirill Butt was seen in our Emergency Department today, 09/16/17.  I expect his condition to improve over the next few days.  he may return to work/school when improved.    Sincerely,  PRABHU TEIXEIRA MD

## 2017-09-16 NOTE — ED AVS SNAPSHOT
[facility], Emergency Department    [address], [place] [address]    Phone:  [phone]                                       Kirill Butt   MRN: [id number]    Department:  [facility], Emergency Department   Date of Visit:  9/16/2017           Patient Information     Date Of Birth          1990        Your diagnoses for this visit were:     Severe headache     Diarrhea, unspecified type        You were seen by Ovidio Jaimes MD.        Discharge Instructions       Tylenol or Naproxen as needed for pain.  Return for worsening headaches, fever, nausea, vomiting, new or worsening symptoms.  Follow up with your regular eye doctor and rheumatologist.    24 Hour Appointment Hotline       To make an appointment at any [facility] clinic, call [phone] ([phone]). If you don't have a family doctor or clinic, we will help you find one. [facility] clinics are conveniently located to serve the needs of you and your family.             Review of your medicines      START taking        Dose / Directions Last dose taken    naproxen 500 MG tablet   Commonly known as:  NAPROSYN   Dose:  500 mg   Quantity:  24 tablet        Take 1 tablet (500 mg) by mouth 2 times daily as needed for moderate pain   Refills:  0          Our records show that you are taking the medicines listed below. If these are incorrect, please call your family doctor or clinic.        Dose / Directions Last dose taken    adalimumab 40 MG/0.8ML pen kit   Commonly known as:  HUMIRA PEN   Dose:  40 mg   Quantity:  2 each        Inject 0.8 mLs (40 mg) Subcutaneous every 14 days   Refills:  2        atropine 1 % ophthalmic solution   Dose:  1 drop   Quantity:  5 mL        Place 1 drop Into the left eye 3 times daily   Refills:  4        prednisoLONE acetate 1 % ophthalmic susp   Commonly known as:  PRED FORTE   Dose:  1 drop   Quantity:  1 Bottle        Place 1 drop into both eyes every 2 hours (while awake)   Refills:  6         predniSONE 10 MG tablet   Commonly known as:  DELTASONE   Dose:  20 mg   Quantity:  60 tablet        Take 2 tablets (20 mg) by mouth 2 times daily   Refills:  3                Prescriptions were sent or printed at these locations (1 Prescription)                   Other Prescriptions                Printed at Department/Unit printer (1 of 1)         naproxen (NAPROSYN) 500 MG tablet                Procedures and tests performed during your visit     CBC with platelets differential    CRP inflammation    CSF Culture Aerobic Bacterial    CT Head Angio w/o & w Contrast    Cell count with differential CSF: Tube 4    Comprehensive metabolic panel    Enterovirus PCR CSF    Erythrocyte sedimentation rate auto    Glucose CSF: Tube 2    Gram stain    HSV Types 1 and 2 Qualitative PCR CSF: Tube 3    INR    Lactic acid    Magnesium    Protein total CSF: Tube 2      Orders Needing Specimen Collection     None      Pending Results     Date and Time Order Name Status Description    9/16/2017 2040 HSV Types 1 and 2 Qualitative PCR CSF: Tube 3 In process     9/16/2017 2040 Enterovirus PCR CSF In process     9/16/2017 2040 CSF Culture Aerobic Bacterial Preliminary     9/16/2017 2040 Protein total CSF: Tube 2 In process     9/16/2017 2040 Glucose CSF: Tube 2 In process             Pending Culture Results     Date and Time Order Name Status Description    9/16/2017 2040 HSV Types 1 and 2 Qualitative PCR CSF: Tube 3 In process     9/16/2017 2040 Enterovirus PCR CSF In process     9/16/2017 2040 CSF Culture Aerobic Bacterial Preliminary     9/16/2017 2040 Protein total CSF: Tube 2 In process     9/16/2017 2040 Glucose CSF: Tube 2 In process             Pending Results Instructions     If you had any lab results that were not finalized at the time of your Discharge, you can call the ED Lab Result RN at [phone]. You will be contacted by this team for any positive Lab results or changes in treatment. The nurses are available 7 days  a week from 10A to 6:30P.  You can leave a message 24 hours per day and they will return your call.        Thank you for choosing [facility]       Thank you for choosing [facility] for your care. Our goal is always to provide you with excellent care. Hearing back from our patients is one way we can continue to improve our services. Please take a few minutes to complete the written survey that you may receive in the mail after you visit with us. Thank you!        [employer] Information     [employer] gives you secure access to your electronic health record. If you see a primary care provider, you can also send messages to your care team and make appointments. If you have questions, please call your primary care clinic.  If you do not have a primary care provider, please call [phone] and they will assist you.        Care EveryWhere ID     This is your Care EveryWhere ID. This could be used by other organizations to access your [facility] medical records  [id number]        Equal Access to Services     ALBANIA DELEON : Reese Kenney, esteban hull, ninoska carvajal, erick mckeon . So [facility] [phone].    ATENCIÓN: Si habla español, tiene a banks disposición servicios gratuitos de asistencia lingüística. Lljuancho al [phone].    We comply with applicable federal civil rights laws and [place] laws. We do not discriminate on the basis of race, color, national origin, age, disability sex, sexual orientation or gender identity.            After Visit Summary       This is your record. Keep this with you and show to your community pharmacist(s) and doctor(s) at your next visit.

## 2017-09-17 LAB
EV RNA SPEC QL NAA+PROBE: NEGATIVE
HSV1 DNA CSF QL NAA+PROBE: NOT DETECTED
HSV2 DNA CSF QL NAA+PROBE: NOT DETECTED
MICROBIOLOGIST REVIEW: NORMAL
SPECIMEN SOURCE: NORMAL

## 2017-09-17 NOTE — DISCHARGE INSTRUCTIONS
Tylenol or Naproxen as needed for pain.  Return for worsening headaches, fever, nausea, vomiting, new or worsening symptoms.  Follow up with your regular eye doctor and rheumatologist.

## 2017-09-21 DIAGNOSIS — H20.9 IRITIS: ICD-10-CM

## 2017-09-21 LAB
BACTERIA SPEC CULT: NO GROWTH
SPECIMEN SOURCE: NORMAL

## 2017-09-21 RX ORDER — PREDNISOLONE ACETATE 10 MG/ML
1 SUSPENSION/ DROPS OPHTHALMIC
Qty: 1 BOTTLE | Refills: 6 | Status: SHIPPED | OUTPATIENT
Start: 2017-09-21 | End: 2017-11-20

## 2017-09-21 NOTE — TELEPHONE ENCOUNTER
Per Dr. Delgado, patient is to be taking Prednisolone QID right eye and q2h left eye. Refilled to [facility] on [address] for patient.

## 2017-09-26 DIAGNOSIS — Z79.899 HIGH RISK MEDICATION USE: ICD-10-CM

## 2017-09-26 LAB — HIV 1+2 AB+HIV1 P24 AG SERPL QL IA: NONREACTIVE

## 2017-09-26 PROCEDURE — 87591 N.GONORRHOEAE DNA AMP PROB: CPT | Performed by: INTERNAL MEDICINE

## 2017-09-26 PROCEDURE — 36415 COLL VENOUS BLD VENIPUNCTURE: CPT | Performed by: INTERNAL MEDICINE

## 2017-09-26 PROCEDURE — 86780 TREPONEMA PALLIDUM: CPT | Performed by: INTERNAL MEDICINE

## 2017-09-26 PROCEDURE — 87389 HIV-1 AG W/HIV-1&-2 AB AG IA: CPT | Performed by: INTERNAL MEDICINE

## 2017-09-27 LAB
N GONORRHOEA DNA SPEC QL NAA+PROBE: NEGATIVE
SPECIMEN SOURCE: NORMAL
T PALLIDUM IGG+IGM SER QL: NEGATIVE

## 2017-11-20 ENCOUNTER — TELEPHONE (OUTPATIENT)
Dept: OPHTHALMOLOGY | Facility: CLINIC | Age: 27
End: 2017-11-20

## 2017-11-20 DIAGNOSIS — H20.9 IRITIS: ICD-10-CM

## 2017-11-20 RX ORDER — ATROPINE SULFATE 10 MG/ML
1 SOLUTION/ DROPS OPHTHALMIC 3 TIMES DAILY
Qty: 5 ML | Refills: 2 | Status: SHIPPED | OUTPATIENT
Start: 2017-11-20 | End: 2018-12-05

## 2017-11-20 RX ORDER — PREDNISOLONE ACETATE 10 MG/ML
1 SUSPENSION/ DROPS OPHTHALMIC
Qty: 1 BOTTLE | Refills: 3 | Status: SHIPPED | OUTPATIENT
Start: 2017-11-20 | End: 2018-03-21

## 2017-11-20 NOTE — TELEPHONE ENCOUNTER
Reason for call:  Other   Patient called regarding (reason for call): prescription  Additional comments: Both eye drops he would like a refill for sent to the pharmacy on file for him      Phone number to reach patient:  Cell number on file:    Telephone Information:   Mobile [phone]       Best Time:  Any     Can we leave a detailed message on this number?  YES

## 2018-03-21 DIAGNOSIS — H20.9 IRITIS: ICD-10-CM

## 2018-03-21 RX ORDER — PREDNISOLONE ACETATE 10 MG/ML
1 SUSPENSION/ DROPS OPHTHALMIC
Qty: 1 BOTTLE | Refills: 0 | Status: SHIPPED | OUTPATIENT
Start: 2018-03-21 | End: 2018-11-29

## 2018-03-21 NOTE — TELEPHONE ENCOUNTER
Refilling Prednisolone Per Dr. Danny Roy should be seen for further refills. Patient states going to schedule appointment when new insurance kicks in 2 weeks from now.

## 2018-04-18 ENCOUNTER — OFFICE VISIT (OUTPATIENT)
Dept: OPHTHALMOLOGY | Facility: CLINIC | Age: 28
End: 2018-04-18
Payer: COMMERCIAL

## 2018-04-18 DIAGNOSIS — H44.40 HYPOTONY OF LEFT EYE: ICD-10-CM

## 2018-04-18 DIAGNOSIS — H21.42: Primary | ICD-10-CM

## 2018-04-18 DIAGNOSIS — H20.9 IRITIS: ICD-10-CM

## 2018-04-18 DIAGNOSIS — H33.42 TRACTION DETACHMENT OF RETINA, LEFT EYE: ICD-10-CM

## 2018-04-18 DIAGNOSIS — H30.22 INTERMEDIATE UVEITIS, LEFT: ICD-10-CM

## 2018-04-18 PROCEDURE — 92012 INTRM OPH EXAM EST PATIENT: CPT | Performed by: OPHTHALMOLOGY

## 2018-04-18 ASSESSMENT — SLIT LAMP EXAM - LIDS
COMMENTS: NORMAL
COMMENTS: NORMAL

## 2018-04-18 ASSESSMENT — REFRACTION_MANIFEST
OD_AXIS: 090
OD_SPHERE: +1.00
OD_CYLINDER: +1.25

## 2018-04-18 ASSESSMENT — VISUAL ACUITY
METHOD: SNELLEN - LINEAR
OD_CC: 20/20
CORRECTION_TYPE: GLASSES

## 2018-04-18 ASSESSMENT — REFRACTION_WEARINGRX
OS_CYLINDER: SPHERE
OD_SPHERE: +0.50
OD_CYLINDER: +0.50
OD_AXIS: 092
OS_SPHERE: PLANO
SPECS_TYPE: SVL

## 2018-04-18 ASSESSMENT — TONOMETRY
IOP_METHOD: APPLANATION
OS_IOP_MMHG: 01
OD_IOP_MMHG: 07

## 2018-04-18 ASSESSMENT — EXTERNAL EXAM - LEFT EYE: OS_EXAM: NORMAL

## 2018-04-18 ASSESSMENT — EXTERNAL EXAM - RIGHT EYE: OD_EXAM: NORMAL

## 2018-04-18 NOTE — MR AVS SNAPSHOT
After Visit Summary   4/18/2018    Kirill Butt    MRN: [id number]           Patient Information     Date Of Birth          1990        Visit Information        Provider Department      4/18/2018 10:45 AM Weston Delgado MD [facility]        Today's Diagnoses     Iris bombe of left eye    -  1    Iritis, ou        Traction detachment of retina, left eye        Intermediate uveitis, left        Vitritis OS        High risk medication use          Care Instructions    Continue same medication.  Call if left eye becomes painful.  Schedule an appointment with Dr. Aburto.  Glasses Rx given - optional  Follow up with Rheumatology.  Return visit 4 months for an intraocular pressure check.  Weston Delgado M.D.  [phone]            Follow-ups after your visit        Additional Services     OPHTHALMOLOGY ADULT REFERRAL       Your provider has referred you to:  [facility]    Please be aware that coverage of these services is subject to the terms and limitations of your health insurance plan.  Call member services at your health plan with any benefit or coverage questions.      Please bring the following to your appointment:  >>   Any x-rays, CTs or MRIs which have been performed.  Contact the facility where they were done to arrange for  prior to your scheduled appointment.  Any new CT, MRI or other procedures ordered by your specialist must be performed at a [facility] facility or coordinated by your clinic's referral office.    >>   List of current medications   >>   This referral request   >>   Any documents/labs given to you for this referral                  Who to contact     If you have questions or need follow up information about today's clinic visit or your schedule please contact [facility] directly at [phone].  Normal or non-critical lab and imaging results will be communicated to you by MyChart, letter or phone within 4 business days  after the clinic has received the results. If you do not hear from us within 7 days, please contact the clinic through [employer] or phone. If you have a critical or abnormal lab result, we will notify you by phone as soon as possible.  Submit refill requests through [employer] or call your pharmacy and they will forward the refill request to us. Please allow 3 business days for your refill to be completed.          Additional Information About Your Visit        [employer] Information     [employer] gives you secure access to your electronic health record. If you see a primary care provider, you can also send messages to your care team and make appointments. If you have questions, please call your primary care clinic.  If you do not have a primary care provider, please call [phone] and they will assist you.        Care EveryWhere ID     This is your Care EveryWhere ID. This could be used by other organizations to access your [facility] medical records  [id number]         Blood Pressure from Last 3 Encounters:   09/16/17 121/70   09/05/17 136/72   08/31/16 118/80    Weight from Last 3 Encounters:   09/16/17 68.3 kg (150 lb 9.6 oz)   09/05/17 69.9 kg (154 lb)   08/31/16 71.4 kg (157 lb 6.4 oz)              We Performed the Following     OPHTHALMOLOGY ADULT REFERRAL        Primary Care Provider Fax #    Provider Not In System [phone]                Equal Access to Services     ALBANIA DELEON : Hadii amarilis ku hadasho Soomaali, waaxda luqadaha, qaybta kaalmada adekalyn, erick goodman. So [facility] [phone].    ATENCIÓN: Si habla español, tiene a banks disposición servicios gratuitos de asistencia lingüística. Llame al [phone].    We comply with applicable federal civil rights laws and [place] laws. We do not discriminate on the basis of race, color, national origin, age, disability, sex, sexual orientation, or gender identity.            Thank you!     Thank you for choosing [facility]  [facility]  for your care. Our goal is always to provide you with excellent care. Hearing back from our patients is one way we can continue to improve our services. Please take a few minutes to complete the written survey that you may receive in the mail after your visit with us. Thank you!             Your Updated Medication List - Protect others around you: Learn how to safely use, store and throw away your medicines at www.disposemymeds.org.          This list is accurate as of 4/18/18 12:16 PM.  Always use your most recent med list.                   Brand Name Dispense Instructions for use Diagnosis    adalimumab 40 MG/0.8ML pen kit    HUMIRA PEN    2 each    Inject 0.8 mLs (40 mg) Subcutaneous every 14 days    Spondyloarthritis (H), High risk medication use, Uveitis       atropine 1 % ophthalmic solution     5 mL    Place 1 drop Into the left eye 3 times daily    Iritis       prednisoLONE acetate 1 % ophthalmic susp    PRED FORTE    1 Bottle    Place 1 drop into both eyes every 2 hours (while awake)    Iritis       predniSONE 10 MG tablet    DELTASONE    60 tablet    Take 2 tablets (20 mg) by mouth 2 times daily    Intermediate uveitis, unspecified laterality

## 2018-04-18 NOTE — PATIENT INSTRUCTIONS
Continue same medication.  Call if left eye becomes painful.  Schedule an appointment with Dr. Aburto.  Glasses Rx given - optional  Follow up with Rheumatology.  Return visit 4 months for an intraocular pressure check.  Weston Delgado M.D.  [phone]

## 2018-04-18 NOTE — PROGRESS NOTES
Current Eye Medications:  Prednisolone 1 % 2-3 times a day.  Atropine as needed left eye for pain.     Subjective:  6 mo recheck iritis left eye.  Pt reports the condition of his left remains about the same, continues to have some light sensitivity and vision remains very poor.    Not currently on systemic med for rheumatologic disease.  Uses atropine on occasion for pain but causes irritation.     Objective:  See Ophthalmology Exam.       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      ICD-10-CM    1. Iris bombe of left eye H21.42    2. Hypotony of left eye H44.40 OPHTHALMOLOGY ADULT REFERRAL   3. Traction detachment of retina, left eye H33.42 OPHTHALMOLOGY ADULT REFERRAL     CANCELED: OPHTHALMOLOGY ADULT REFERRAL   4. Iritis, ou H20.9    5. 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  Call if left eye becomes painful.  Schedule an appointment with Dr. Aburto.  Glasses Rx given - optional  Follow up with Rheumatology.  Return visit 4 months for an intraocular pressure check.  Weston Delgado M.D.  [phone]

## 2018-04-18 NOTE — LETTER
4/18/2018         RE: Kirill Butt  [address] [address]        Dear Colleague,    Thank you for referring your patient, Kirill Butt, to the [facility]. Please see a copy of my visit note below.     Current Eye Medications:  Prednisolone 1 % 2-3 times a day.  Atropine as needed left eye for pain.     Subjective:  6 mo recheck iritis left eye.  Pt reports the condition of his left remains about the same, continues to have some light sensitivity and vision remains very poor.    Not currently on systemic med for rheumatologic disease.  Uses atropine on occasion for pain but causes irritation.     Objective:  See Ophthalmology Exam.       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      ICD-10-CM    1. Iris bombe of left eye H21.42    2. Hypotony of left eye H44.40 OPHTHALMOLOGY ADULT REFERRAL   3. Traction detachment of retina, left eye H33.42 OPHTHALMOLOGY ADULT REFERRAL     CANCELED: OPHTHALMOLOGY ADULT REFERRAL   4. Iritis, ou H20.9    5. 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  Call if left eye becomes painful.  Schedule an appointment with Dr. Aburto.  Glasses Rx given - optional  Follow up with Rheumatology.  Return visit 4 months for an intraocular pressure check.  Weston Delgado M.D.  [phone]           Again, thank you for allowing me to participate in the care of your patient.        Sincerely,        Weston Delgado MD

## 2018-05-02 ENCOUNTER — TRANSFERRED RECORDS (OUTPATIENT)
Dept: HEALTH INFORMATION MANAGEMENT | Facility: CLINIC | Age: 28
End: 2018-05-02

## 2018-09-21 ENCOUNTER — HOSPITAL ENCOUNTER (EMERGENCY)
Facility: CLINIC | Age: 28
Discharge: HOME | End: 2018-09-21
Attending: FAMILY MEDICINE | Admitting: FAMILY MEDICINE
Payer: COMMERCIAL

## 2018-09-21 ENCOUNTER — APPOINTMENT (OUTPATIENT)
Dept: ULTRASOUND IMAGING | Facility: CLINIC | Age: 28
End: 2018-09-21
Attending: FAMILY MEDICINE
Payer: COMMERCIAL

## 2018-09-21 VITALS
RESPIRATION RATE: 16 BRPM | WEIGHT: 160 LBS | OXYGEN SATURATION: 99 % | HEIGHT: 67 IN | HEART RATE: 79 BPM | SYSTOLIC BLOOD PRESSURE: 107 MMHG | BODY MASS INDEX: 25.11 KG/M2 | DIASTOLIC BLOOD PRESSURE: 68 MMHG | TEMPERATURE: 97.5 F

## 2018-09-21 DIAGNOSIS — N50.811 TESTICULAR PAIN, RIGHT: ICD-10-CM

## 2018-09-21 DIAGNOSIS — S76.211A GROIN STRAIN, RIGHT, INITIAL ENCOUNTER: ICD-10-CM

## 2018-09-21 LAB
ALBUMIN UR-MCNC: NEGATIVE MG/DL
APPEARANCE UR: CLEAR
BILIRUB UR QL STRIP: NEGATIVE
COLOR UR AUTO: NORMAL
GLUCOSE UR STRIP-MCNC: NEGATIVE MG/DL
HGB UR QL STRIP: NEGATIVE
KETONES UR STRIP-MCNC: NEGATIVE MG/DL
LEUKOCYTE ESTERASE UR QL STRIP: NEGATIVE
NITRATE UR QL: NEGATIVE
PH UR STRIP: 6.5 PH (ref 5–7)
SOURCE: NORMAL
SP GR UR STRIP: 1.01 (ref 1–1.03)
UROBILINOGEN UR STRIP-MCNC: NORMAL MG/DL (ref 0–2)

## 2018-09-21 PROCEDURE — 87591 N.GONORRHOEAE DNA AMP PROB: CPT | Performed by: FAMILY MEDICINE

## 2018-09-21 PROCEDURE — 93976 VASCULAR STUDY: CPT

## 2018-09-21 PROCEDURE — 99284 EMERGENCY DEPT VISIT MOD MDM: CPT | Mod: 25 | Performed by: FAMILY MEDICINE

## 2018-09-21 PROCEDURE — 99283 EMERGENCY DEPT VISIT LOW MDM: CPT | Mod: Z6 | Performed by: FAMILY MEDICINE

## 2018-09-21 PROCEDURE — 25000132 ZZH RX MED GY IP 250 OP 250 PS 637: Performed by: FAMILY MEDICINE

## 2018-09-21 PROCEDURE — 96372 THER/PROPH/DIAG INJ SC/IM: CPT | Performed by: FAMILY MEDICINE

## 2018-09-21 PROCEDURE — 25000128 H RX IP 250 OP 636: Performed by: FAMILY MEDICINE

## 2018-09-21 PROCEDURE — 25000125 ZZHC RX 250: Performed by: FAMILY MEDICINE

## 2018-09-21 PROCEDURE — 81003 URINALYSIS AUTO W/O SCOPE: CPT | Performed by: FAMILY MEDICINE

## 2018-09-21 PROCEDURE — 87491 CHLMYD TRACH DNA AMP PROBE: CPT | Performed by: FAMILY MEDICINE

## 2018-09-21 RX ORDER — OXYCODONE AND ACETAMINOPHEN 5; 325 MG/1; MG/1
1 TABLET ORAL ONCE
Status: COMPLETED | OUTPATIENT
Start: 2018-09-21 | End: 2018-09-21

## 2018-09-21 RX ORDER — DOXYCYCLINE 100 MG/1
100 CAPSULE ORAL 2 TIMES DAILY
Qty: 20 CAPSULE | Refills: 0 | Status: SHIPPED | OUTPATIENT
Start: 2018-09-21 | End: 2018-10-01

## 2018-09-21 RX ORDER — OXYCODONE AND ACETAMINOPHEN 5; 325 MG/1; MG/1
1 TABLET ORAL EVERY 6 HOURS PRN
Qty: 12 TABLET | Refills: 0 | Status: SHIPPED | OUTPATIENT
Start: 2018-09-21 | End: 2021-07-07

## 2018-09-21 RX ADMIN — OXYCODONE HYDROCHLORIDE AND ACETAMINOPHEN 1 TABLET: 5; 325 TABLET ORAL at 09:04

## 2018-09-21 RX ADMIN — LIDOCAINE HYDROCHLORIDE 250 MG: 10 INJECTION, SOLUTION EPIDURAL; INFILTRATION; INTRACAUDAL; PERINEURAL at 13:48

## 2018-09-21 ASSESSMENT — ENCOUNTER SYMPTOMS
ABDOMINAL PAIN: 1
FEVER: 0
NAUSEA: 0
SHORTNESS OF BREATH: 0
HEMATURIA: 0
DYSURIA: 0
WEAKNESS: 0
BACK PAIN: 0
FREQUENCY: 1
VOMITING: 0
ABDOMINAL DISTENTION: 0
ACTIVITY CHANGE: 1
DECREASED CONCENTRATION: 0
CHILLS: 0
FLANK PAIN: 0
DYSPHORIC MOOD: 0

## 2018-09-21 NOTE — ED AVS SNAPSHOT
[facility], Emergency Department    [address] [facility] [address]    Phone:  [phone]                                       Kirill Butt   MRN: [id number]    Department:  [facility], Emergency Department   Date of Visit:  9/21/2018           Patient Information     Date Of Birth          1990        Your diagnoses for this visit were:     Groin strain, right, initial encounter     Testicular pain, right        You were seen by Yunier Ocampo MD.        Discharge Instructions       Home.  Your ultrasound looks normal.   Your urine looks ok.  You may have strained abdominal wall with strain of scrotal cord.  Treat you for possible infection also.  Take the doxycycline 100mg twice a day for 10 days.  Take ibuprofen for pain.  Take percocet if needed for breakthrough pain.  Follow up with [facility] next week if not better.  REturn if any worse or concerning symptoms.    Please make an appointment to follow up with Urology Clinic (phone: [phone]) in 7 days if you have any concerns.      Testicular Pain, Unclear Cause  You have had pain in one or both testicles. Based on your exam today, the exact cause of your pain is not certain. But your condition does not appear to be dangerous. Testicles are very sensitive. Even a small injury can cause quite a bit of pain. Other possible causes of testicular pain include kidney stones, cysts, mumps, inflammatory conditions, chronic conditions, hernia, infection, and a twisted testicle.  Certain tests may be done to rule out an underlying problem causing the pain. Nothing conclusive was found today. Most likely, the pain will go away on its own. If it doesn t, you may need more tests.    Home care  Medicine may be prescribed to help relieve pain and swelling. This may be an over-the-counter pain reliever or prescription pain medication. Take all medicine as directed.  The following are general care guidelines:    To relieve pain and swelling,  apply an ice pack wrapped in a thin towel for 10 minutes at a time. Continue this on and off for 1 to 2 days.    When lying down, place a small rolled towel under your scrotum. When moving around, wear a jockstrap (athletic supporter) or supportive underwear. These will help support and protect your testicles.    If it hurts to walk, walk as little as possible until you feel better.    Avoid strenuous activity until you feel better.    Do not have sex until you feel better.    If you have severe pain in the testicle, seek care right away. Delay may lead to permanent loss of the testicle s function.  Follow-up care  Follow up with your healthcare provider, or as advised.  When to seek medical advice  Call your healthcare provider right away if any of these occur:    Fever of 100.4 F (38 C) or higher    Worsening of the pain or severe pain    Swelling of the testicle or scrotum    A lump in the scrotum    Warm and red scrotum (signs of infection)    Nausea and vomiting    Pain or swelling in abdomen    Trouble urinating    Numbness or weakness in the leg    Shrinking of the testicle    Blood in your urine  Date Last Reviewed: 10/1/2016    8045-9366 [employer]. [address] [place] [address]. All rights reserved. This information is not intended as a substitute for professional medical care. Always follow your healthcare professional's instructions.          24 Hour Appointment Hotline       To make an appointment at any [place] clinic, call [phone] ([phone]). If you don't have a family doctor or clinic, we will help you find one. [place] clinics are conveniently located to serve the needs of you and your family.             Review of your medicines      START taking        Dose / Directions Last dose taken    doxycycline 100 MG capsule   Commonly known as:  VIBRAMYCIN   Dose:  100 mg   Quantity:  20 capsule        Take 1 capsule (100 mg) by mouth 2 times daily for 10 days    Refills:  0        oxyCODONE-acetaminophen 5-325 MG per tablet   Commonly known as:  PERCOCET   Dose:  1 tablet   Quantity:  12 tablet        Take 1 tablet by mouth every 6 hours as needed for moderate to severe pain or severe pain   Refills:  0          Our records show that you are taking the medicines listed below. If these are incorrect, please call your family doctor or clinic.        Dose / Directions Last dose taken    adalimumab 40 MG/0.8ML pen kit   Commonly known as:  HUMIRA PEN   Dose:  40 mg   Quantity:  2 each        Inject 0.8 mLs (40 mg) Subcutaneous every 14 days   Refills:  2        atropine 1 % ophthalmic solution   Dose:  1 drop   Quantity:  5 mL        Place 1 drop Into the left eye 3 times daily   Refills:  2        prednisoLONE acetate 1 % ophthalmic susp   Commonly known as:  PRED FORTE   Dose:  1 drop   Quantity:  1 Bottle        Place 1 drop into both eyes every 2 hours (while awake)   Refills:  0        predniSONE 10 MG tablet   Commonly known as:  DELTASONE   Dose:  20 mg   Quantity:  60 tablet        Take 2 tablets (20 mg) by mouth 2 times daily   Refills:  3                Information about OPIOIDS     PRESCRIPTION OPIOIDS: WHAT YOU NEED TO KNOW   We gave you an opioid (narcotic) pain medicine. It is important to manage your pain, but opioids are not always the best choice. You should first try all the other options your care team gave you. Take this medicine for as short a time (and as few doses) as possible.    Some activities can increase your pain, such as bandage changes or therapy sessions. It may help to take your pain medicine 30 to 60 minutes before these activities. Reduce your stress by getting enough sleep, working on hobbies you enjoy and practicing relaxation or meditation. Talk to your care team about ways to manage your pain beyond prescription opioids.    These medicines have risks:    DO NOT drive when on new or higher doses of pain medicine. These medicines can  affect your alertness and reaction times, and you could be arrested for driving under the influence (DUI). If you need to use opioids long-term, talk to your care team about driving.    DO NOT operate heavy machinery    DO NOT do any other dangerous activities while taking these medicines.    DO NOT drink any alcohol while taking these medicines.     If the opioid prescribed includes acetaminophen, DO NOT take with any other medicines that contain acetaminophen. Read all labels carefully. Look for the word  acetaminophen  or  Tylenol.  Ask your pharmacist if you have questions or are unsure.    You can get addicted to pain medicines, especially if you have a history of addiction (chemical, alcohol or substance dependence). Talk to your care team about ways to reduce this risk.    All opioids tend to cause constipation. Drink plenty of water and eat foods that have a lot of fiber, such as fruits, vegetables, prune juice, apple juice and high-fiber cereal. Take a laxative (Miralax, milk of magnesia, Colace, Senna) if you don t move your bowels at least every other day. Other side effects include upset stomach, sleepiness, dizziness, throwing up, tolerance (needing more of the medicine to have the same effect), physical dependence and slowed breathing.    Store your pills in a secure place, locked if possible. We will not replace any lost or stolen medicine. If you don t finish your medicine, please throw away (dispose) as directed by your pharmacist. The [place] Pollution Control Agency has more information about safe disposal: https://www.pca.[facility].mn.us/living-green/managing-unwanted-medications        Prescriptions were sent or printed at these locations (2 Prescriptions)                   Other Prescriptions                Printed at Department/Unit printer (2 of 2)         doxycycline (VIBRAMYCIN) 100 MG capsule               oxyCODONE-acetaminophen (PERCOCET) 5-325 MG per tablet                Procedures and  tests performed during your visit     Chlamydia trachomatis PCR    Neisseria gonorrhoeae PCR    UA reflex to Microscopic    US Testicular & Scrotum w Doppler Ltd      Orders Needing Specimen Collection     None      Pending Results     Date and Time Order Name Status Description    9/21/2018 1022 Chlamydia trachomatis PCR In process     9/21/2018 1022 Neisseria gonorrhoeae PCR In process             Pending Culture Results     Date and Time Order Name Status Description    9/21/2018 1022 Chlamydia trachomatis PCR In process     9/21/2018 1022 Neisseria gonorrhoeae PCR In process             Pending Results Instructions     If you had any lab results that were not finalized at the time of your Discharge, you can call the ED Lab Result RN at [phone]. You will be contacted by this team for any positive Lab results or changes in treatment. The nurses are available 7 days a week from 10A to 6:30P.  You can leave a message 24 hours per day and they will return your call.        Thank you for choosing [facility]       Thank you for choosing [facility] for your care. Our goal is always to provide you with excellent care. Hearing back from our patients is one way we can continue to improve our services. Please take a few minutes to complete the written survey that you may receive in the mail after you visit with us. Thank you!        [email or web address] Information     [employer] gives you secure access to your electronic health record. If you see a primary care provider, you can also send messages to your care team and make appointments. If you have questions, please call your primary care clinic.  If you do not have a primary care provider, please call [phone] and they will assist you.        Care EveryWhere ID     This is your Care EveryWhere ID. This could be used by other organizations to access your [facility] medical records  [id number]        Equal Access to Services     [place] DELEON AH: esteban Segura  ninoska hull waxay idiin hayaan adeeg kharash la'aan ah. So [facility] [phone].    ATENCIÓN: Si habla janette, tiene a banks disposición servicios gratuitos de asistencia lingüística. Llame al [phone].    We comply with applicable federal civil rights laws and [place] laws. We do not discriminate on the basis of race, color, national origin, age, disability, sex, sexual orientation, or gender identity.            After Visit Summary       This is your record. Keep this with you and show to your community pharmacist(s) and doctor(s) at your next visit.

## 2018-09-21 NOTE — DISCHARGE INSTRUCTIONS
Home.  Your ultrasound looks normal.   Your urine looks ok.  You may have strained abdominal wall with strain of scrotal cord.  Treat you for possible infection also.  Take the doxycycline 100mg twice a day for 10 days.  Take ibuprofen for pain.  Take percocet if needed for breakthrough pain.  [facility] next week if not better.  REturn if any worse or concerning symptoms.    Please make an appointment to follow up with Urology Clinic (phone: [phone]) in 7 days if you have any concerns.      Testicular Pain, Unclear Cause  You have had pain in one or both testicles. Based on your exam today, the exact cause of your pain is not certain. But your condition does not appear to be dangerous. Testicles are very sensitive. Even a small injury can cause quite a bit of pain. Other possible causes of testicular pain include kidney stones, cysts, mumps, inflammatory conditions, chronic conditions, hernia, infection, and a twisted testicle.  Certain tests may be done to rule out an underlying problem causing the pain. Nothing conclusive was found today. Most likely, the pain will go away on its own. If it doesn t, you may need more tests.    Home care  Medicine may be prescribed to help relieve pain and swelling. This may be an over-the-counter pain reliever or prescription pain medication. Take all medicine as directed.  The following are general care guidelines:    To relieve pain and swelling, apply an ice pack wrapped in a thin towel for 10 minutes at a time. Continue this on and off for 1 to 2 days.    When lying down, place a small rolled towel under your scrotum. When moving around, wear a jockstrap (athletic supporter) or supportive underwear. These will help support and protect your testicles.    If it hurts to walk, walk as little as possible until you feel better.    Avoid strenuous activity until you feel better.    Do not have sex until you feel better.    If you have severe pain in the  testicle, seek care right away. Delay may lead to permanent loss of the testicle s function.  Follow-up care  Follow up with your healthcare provider, or as advised.  When to seek medical advice  Call your healthcare provider right away if any of these occur:    Fever of 100.4 F (38 C) or higher    Worsening of the pain or severe pain    Swelling of the testicle or scrotum    A lump in the scrotum    Warm and red scrotum (signs of infection)    Nausea and vomiting    Pain or swelling in abdomen    Trouble urinating    Numbness or weakness in the leg    Shrinking of the testicle    Blood in your urine  Date Last Reviewed: 10/1/2016    8572-3397 [employer]. [address] [place] [address], [place] [address]. All rights reserved. This information is not intended as a substitute for professional medical care. Always follow your healthcare professional's instructions.

## 2018-09-21 NOTE — ED TRIAGE NOTES
Pt presents ambulatory to triage with c/o lower abdominal pain for the past 3 days with frequent urination. Reports no relief with Ibuprofen and Tylenol. Denies N/V.

## 2018-09-21 NOTE — ED PROVIDER NOTES
[facility] EMERGENCY DEPARTMENT ([facility])  September 21, 2018    History     Chief Complaint   Patient presents with     Abdominal Pain     HPI  Kirill Butt is a 28 year old male with a history of spondyloarthritis who presents to the ED with lower abdominal pain and R testicular pain and swelling. Patient states his symptoms started a couple of days ago and did do some heavy lifting recently. He has tried taking ibuprofen and Tylenol without relief.  He also reports having urinary frequency and denies prior history of UTIs. He states he did have and STI when he was 15, but otherwise denies any fevers, chills, nausea, vomiting, prior history of hernias, or abdominal distension. Denies any fullness or hernia. No discharge etc.      PAST MEDICAL HISTORY  Past Medical History:   Diagnosis Date     Ankylosing spondylitis (H)      Arthritis 10/3/2012     CARDIOVASCULAR SCREENING; LDL GOAL LESS THAN 160 9/11/2012     Cataract, mod, os 1/15/2015     Inflammatory spondylopathy (H) 2/1/2013     Iritis      Tear meniscus knee 10/17/2014     Traction detachment of retina, left eye      PAST SURGICAL HISTORY  Past Surgical History:   Procedure Laterality Date     ARTHROSCOPY KNEE RT/LT Right 10/2014     ARTHROSCOPY KNEE WITH MEDIAL MENISCECTOMY Right 7/7/2015    Procedure: ARTHROSCOPY KNEE WITH MEDIAL MENISCECTOMY;  Surgeon: Marquis Degroot MD;  Location: [facility]     IRIDECTOMY Left 08/16/2017    left eye     PHACOEMULSIFICATION WITH STANDARD INTRAOCULAR LENS IMPLANT Left 6/2015     VITRECTOMY PARSPLANA Left 10/2015    retinectomy, macular TRD repair (JJ)     FAMILY HISTORY  Family History   Problem Relation Age of Onset     Hypertension Maternal Grandfather      Aneurysm Father      Brain     Hypertension Mother      Hypertension Maternal Grandmother      Aneurysm Mother      Brain     Aneurysm Maternal Grandfather      Brain      SOCIAL HISTORY  Social History   Substance Use Topics     Smoking  "status: Never Smoker     Smokeless tobacco: Never Used     Alcohol use No     MEDICATIONS  No current facility-administered medications for this encounter.      Current Outpatient Prescriptions   Medication     atropine 1 % ophthalmic solution     doxycycline (VIBRAMYCIN) 100 MG capsule     oxyCODONE-acetaminophen (PERCOCET) 5-325 MG per tablet     prednisoLONE acetate (PRED FORTE) 1 % ophthalmic susp     adalimumab (HUMIRA PEN) 40 MG/0.8ML pen kit     predniSONE (DELTASONE) 10 MG tablet     Facility-Administered Medications Ordered in Other Encounters   Medication     ketorolac (TORADOL) injection     ALLERGIES  No Known Allergies      I have reviewed the Medications, Allergies, Past Medical and Surgical History, and Social History in the Epic system.    Review of Systems   Constitutional: Positive for activity change. Negative for chills and fever.   Respiratory: Negative for shortness of breath.    Cardiovascular: Negative for chest pain.   Gastrointestinal: Positive for abdominal pain (lower abdominal pain). Negative for abdominal distention, nausea and vomiting.   Genitourinary: Positive for frequency, scrotal swelling (R sided ) and testicular pain (R). Negative for discharge, dysuria, flank pain and hematuria.   Musculoskeletal: Negative for back pain and gait problem.   Skin: Negative for rash.   Allergic/Immunologic: Negative for immunocompromised state.   Neurological: Negative for weakness.   Psychiatric/Behavioral: Negative for decreased concentration and dysphoric mood.   All other systems reviewed and are negative.      Physical Exam   BP: (!) 129/91  Pulse: 79  Temp: 97.5  F (36.4  C)  Resp: 16  Height: 170.2 cm (5' 7\")  Weight: 72.6 kg (160 lb) (standing scale)  SpO2: 99 %      Physical Exam   Constitutional: He is oriented to person, place, and time. He appears well-developed and well-nourished. He appears distressed.   Mild distress with pain but nontoxic. Not writhing in pain.   HENT:   Head: " Normocephalic and atraumatic.   Mouth/Throat: Oropharynx is clear and moist. No oropharyngeal exudate.   Eyes: Pupils are equal, round, and reactive to light. No scleral icterus.   Neck: Normal range of motion. Neck supple.   Cardiovascular: Normal heart sounds and intact distal pulses.    Pulmonary/Chest: Breath sounds normal. No respiratory distress.   Abdominal: Soft. Bowel sounds are normal. He exhibits no mass. There is tenderness.   Lower ab tenderness without sign of mass or fullness or hernia.   Genitourinary: No penile tenderness.   Genitourinary Comments: Right testicle and cord tender without marked swelling or sign of torsion. No hernia   Musculoskeletal: He exhibits no edema or tenderness.   Neurological: He is alert and oriented to person, place, and time. He has normal reflexes.   Skin: Skin is warm and dry. No rash noted. He is not diaphoretic. No erythema. No pallor.   Psychiatric:   appropriate   Nursing note and vitals reviewed.      ED Course     ED Course     Procedures   8:52 AM  The patient was seen and examined by Dr. Ocampo in Room 7.   Patient given percocet for pain with improvement.  UA negative.  uriprobe ordered.    Testicle us done with neg for torsion or increase flow. ? Small hydrocele noted.    Discussed with urology. Will treat for clinical epididimytis.    Ceftriaxone 250mg IM given.  Patient ok homw with treatment of infection and also trauma with Urology follow up next week.    Patient agrees with pain.                 Critical Care time:  none             Labs Ordered and Resulted from Time of ED Arrival Up to the Time of Departure from the ED   URINE MACROSCOPIC WITH REFLEX TO MICRO   NEISSERIA GONORRHOEAE PCR   CHLAMYDIA TRACHOMATIS PCR     Results for orders placed or performed during the hospital encounter of 09/21/18   US Testicular & Scrotum w Doppler Ltd    Narrative    EXAMINATION: US TESTICULAR AND SCROTAL, 9/21/2018 9:42 AM     COMPARISON: None.    HISTORY: Right  scrotal pain    TECHNIQUE: The scrotum was scanned in standard fashion with  specialized ultrasound transducer(s) using both grey scale and limited  color Doppler techniques.    Findings:  The testes demonstrate normal and symmetric echotexture and  vascularity. No evidence of a focal lesion.  The right testicle  measures 5 x 2.8 x 2.9  cm and the left measures 4.5 x 3 x 2.4 cm.  There is no evidence of torsion.    Small right hydrocele, likely reactive. There is no varicocele.    Both the right and left epididymis are within normal limits.  No hyperemia to the testes or epididymis.      Impression    Impression:   1. No sonographic or Doppler evidence of acute pathology.  2. Small right hydrocele, which is nonspecific but can be seen in the  setting of trauma.    I have personally reviewed the examination and initial interpretation  and I agree with the findings.    ANDRESSA GRIFFIN MD   UA reflex to Microscopic   Result Value Ref Range    Color Urine Light Yellow     Appearance Urine Clear     Glucose Urine Negative NEG^Negative mg/dL    Bilirubin Urine Negative NEG^Negative    Ketones Urine Negative NEG^Negative mg/dL    Specific Gravity Urine 1.014 1.003 - 1.035    Blood Urine Negative NEG^Negative    pH Urine 6.5 5.0 - 7.0 pH    Protein Albumin Urine Negative NEG^Negative mg/dL    Urobilinogen mg/dL Normal 0.0 - 2.0 mg/dL    Nitrite Urine Negative NEG^Negative    Leukocyte Esterase Urine Negative NEG^Negative    Source Midstream Urine        Consults  Urology: Responded (09/21/18 6905)    Assessments & Plan (with Medical Decision Making)  29 yo male with right testicle pain and right groin pain since heavy lifting 3 days ago. Patient eval with right testicle tenderness but no hernia noted. No hx of sti but ddx epididimyitis infect vs trauma. Treated with ceftriaxone and doxycycline with follow up urology next week. Patient agrees with plan.           I have reviewed the nursing notes.    I have reviewed the  findings, diagnosis, plan and need for follow up with the patient.    Discharge Medication List as of 9/21/2018  2:04 PM      START taking these medications    Details   doxycycline (VIBRAMYCIN) 100 MG capsule Take 1 capsule (100 mg) by mouth 2 times daily for 10 days, Disp-20 capsule, R-0, Local Print      oxyCODONE-acetaminophen (PERCOCET) 5-325 MG per tablet Take 1 tablet by mouth every 6 hours as needed for moderate to severe pain or severe pain, Disp-12 tablet, R-0, Local Print             Final diagnoses:   Groin strain, right, initial encounter   Testicular pain, right     IJj, am serving as a trained medical scribe to document services personally performed by Yunier Ocampo MD, based on the provider's statements to me.      IYunier MD, was physically present and have reviewed and verified the accuracy of this note documented by Jj Phillips.     9/21/2018   [facility], EMERGENCY DEPARTMENT      This note was created at least in part by the use of dragon voice dictation system. Inadvertent typographical errors may still exist.  Yunier Ocampo MD.         Yunier Ocampo MD  09/21/18 4551

## 2018-09-21 NOTE — ED AVS SNAPSHOT
[facility], [place], Emergency Department    [address] [place] [address]    Phone:  [phone]                                       Kirill Butt   MRN: [id number]    Department:  [facility], Emergency Department   Date of Visit:  9/21/2018           After Visit Summary Signature Page     I have received my discharge instructions, and my questions have been answered. I have discussed any challenges I see with this plan with the nurse or doctor.    ..........................................................................................................................................  Patient/Patient Representative Signature      ..........................................................................................................................................  Patient Representative Print Name and Relationship to Patient    ..................................................               ................................................  Date                                   Time    ..........................................................................................................................................  Reviewed by Signature/Title    ...................................................              ..............................................  Date                                               Time          22EPIC Rev 08/18

## 2018-09-23 LAB
C TRACH DNA SPEC QL NAA+PROBE: NEGATIVE
N GONORRHOEA DNA SPEC QL NAA+PROBE: NEGATIVE
SPECIMEN SOURCE: NORMAL
SPECIMEN SOURCE: NORMAL

## 2018-11-29 ENCOUNTER — TELEPHONE (OUTPATIENT)
Dept: OPHTHALMOLOGY | Facility: CLINIC | Age: 28
End: 2018-11-29

## 2018-11-29 DIAGNOSIS — H20.9 IRITIS: ICD-10-CM

## 2018-11-29 RX ORDER — PREDNISOLONE ACETATE 10 MG/ML
1 SUSPENSION/ DROPS OPHTHALMIC 4 TIMES DAILY
Qty: 1 BOTTLE | Refills: 3 | Status: SHIPPED | OUTPATIENT
Start: 2018-11-29 | End: 2018-12-05

## 2018-11-29 NOTE — TELEPHONE ENCOUNTER
Reason for Call:  Other prescription    Detailed comments: Patient had pharmacy fax a refill request. Patient wants to inform Dr. Hedrick he has made an appointment for 12/5/18 for a 6 month recheck. Patient would like to know if he can get a refill before being seen, please call to discuss thank you.     Phone Number Patient can be reached at: Home number on file [phone] (home)    Best Time: any    Can we leave a detailed message on this number? YES    Call taken on 11/29/2018 at 8:22 AM by JANET BOSCH

## 2018-11-29 NOTE — TELEPHONE ENCOUNTER
No future appointments scheduled.  I asked the pharmacy to remind the patient to schedule an appointment with Dr. Delgado.

## 2018-11-29 NOTE — TELEPHONE ENCOUNTER
Fax received from pharmacy requesting refill of prednisoLONE acetate (PRED FORTE) 1 % ophthalmic susp.

## 2018-12-05 ENCOUNTER — OFFICE VISIT (OUTPATIENT)
Dept: OPHTHALMOLOGY | Facility: CLINIC | Age: 28
End: 2018-12-05
Payer: COMMERCIAL

## 2018-12-05 DIAGNOSIS — M47.819 SPONDYLOARTHRITIS: ICD-10-CM

## 2018-12-05 DIAGNOSIS — H20.9 IRITIS: Primary | ICD-10-CM

## 2018-12-05 DIAGNOSIS — H30.22 INTERMEDIATE UVEITIS, LEFT: ICD-10-CM

## 2018-12-05 DIAGNOSIS — H33.42 TRACTION DETACHMENT OF RETINA, LEFT EYE: ICD-10-CM

## 2018-12-05 PROCEDURE — 92012 INTRM OPH EXAM EST PATIENT: CPT | Performed by: OPHTHALMOLOGY

## 2018-12-05 RX ORDER — PREDNISOLONE ACETATE 10 MG/ML
1 SUSPENSION/ DROPS OPHTHALMIC 4 TIMES DAILY
Qty: 1 BOTTLE | Refills: 3 | Status: SHIPPED | OUTPATIENT
Start: 2018-12-05 | End: 2019-02-12

## 2018-12-05 RX ORDER — ATROPINE SULFATE 10 MG/ML
1 SOLUTION/ DROPS OPHTHALMIC 3 TIMES DAILY
Qty: 5 ML | Refills: 2 | Status: SHIPPED | OUTPATIENT
Start: 2018-12-05 | End: 2020-01-23

## 2018-12-05 ASSESSMENT — EXTERNAL EXAM - LEFT EYE: OS_EXAM: NORMAL

## 2018-12-05 ASSESSMENT — SLIT LAMP EXAM - LIDS
COMMENTS: NORMAL
COMMENTS: NORMAL

## 2018-12-05 ASSESSMENT — TONOMETRY
OS_IOP_MMHG: 06
IOP_METHOD: ICARE
OD_IOP_MMHG: 09

## 2018-12-05 ASSESSMENT — VISUAL ACUITY
OS_CC: HM
CORRECTION_TYPE: GLASSES
OD_CC: 20/20
METHOD: SNELLEN - LINEAR

## 2018-12-05 ASSESSMENT — EXTERNAL EXAM - RIGHT EYE: OD_EXAM: NORMAL

## 2018-12-05 NOTE — LETTER
12/5/2018         RE: Kirill Butt  [address] [facility] [address]        Dear Colleague,    Thank you for referring your patient, Kirill Butt, to the [facility]. Please see a copy of my visit note below.     Current Eye Medications:  Prednisolone four times a day left eye. Ran out of atropine 1 month ago.     Subjective:  6 month Iritis follow up. Vision is OK with glasses, poor with out glasses. Patient feels he is more reliant on glasses. Left eye has a aching and burning pain (pain scale 9/10 when it is bad). When pain gets bad, patient has to sit in dark room.     Not on systemic meds.  No significant joint pain at present.  No symptoms right eye.     Objective:  See Ophthalmology Exam.       Assessment:  Moderate pain and inflammation in blind left eye off meds.      Plan:  Continue using Prednisolone left eye four times daily,  And Atropine left eye two to three times daily.  Wait at least 5 minutes between drops if using more than one at a time.  Recommend scheduling an appointment with Dr. Rodriguez.   Return visit in 6 months for complete exam.    Weston Delgado M.D.  [phone]           Again, thank you for allowing me to participate in the care of your patient.        Sincerely,        Weston Delgado MD

## 2018-12-05 NOTE — MR AVS SNAPSHOT
After Visit Summary   12/5/2018    Kirill Butt    MRN: [id number]           Patient Information     Date Of Birth          1990        Visit Information        Provider Department      12/5/2018 10:30 AM Weston Delgado MD [facility]        Today's Diagnoses     Iritis, ou          Care Instructions    Continue using Prednisolone left eye four times daily,  And Atropine left eye two to three times daily.  Wait at least 5 minutes between drops if using more than one at a time.  Recommend scheduling an appointment with Dr. Rodriguez.   Return visit in 6 months for complete exam.    Weston Delgado M.D.  [phone]            Follow-ups after your visit        Who to contact     If you have questions or need follow up information about today's clinic visit or your schedule please contact [facility] directly at [phone].  Normal or non-critical lab and imaging results will be communicated to you by MyChart, letter or phone within 4 business days after the clinic has received the results. If you do not hear from us within 7 days, please contact the clinic through [employer] or phone. If you have a critical or abnormal lab result, we will notify you by phone as soon as possible.  Submit refill requests through [employer] or call your pharmacy and they will forward the refill request to us. Please allow 3 business days for your refill to be completed.          Additional Information About Your Visit        MyChart Information     [employer] gives you secure access to your electronic health record. If you see a primary care provider, you can also send messages to your care team and make appointments. If you have questions, please call your primary care clinic.  If you do not have a primary care provider, please call [phone] and they will assist you.        Care EveryWhere ID     This is your Care EveryWhere ID. This could be used by other organizations to access your  [place] medical records  [id number]         Blood Pressure from Last 3 Encounters:   09/21/18 107/68   09/16/17 121/70   09/05/17 136/72    Weight from Last 3 Encounters:   09/21/18 72.6 kg (160 lb)   09/16/17 68.3 kg (150 lb 9.6 oz)   09/05/17 69.9 kg (154 lb)              Today, you had the following     No orders found for display         Where to get your medicines      These medications were sent to [facility] [id number] [address] AT [address]  [address] [facility] [address]     Phone:  [phone]     atropine 1 % ophthalmic solution    prednisoLONE acetate 1 % ophthalmic suspension          Primary Care Provider Fax #    Physician No Ref-Primary [phone]       No address on file        Equal Access to Services     [place] DELEON : Reese Kenney, waluisa hull, ninoska carvajal, erick mckeon . So [facility] [phone].    ATENCIÓN: Si habla español, tiene a banks disposición servicios gratuitos de asistencia lingüística. Mitra al [phone].    We comply with applicable federal civil rights laws and [place] laws. We do not discriminate on the basis of race, color, national origin, age, disability, sex, sexual orientation, or gender identity.            Thank you!     Thank you for choosing [facility]  for your care. Our goal is always to provide you with excellent care. Hearing back from our patients is one way we can continue to improve our services. Please take a few minutes to complete the written survey that you may receive in the mail after your visit with us. Thank you!             Your Updated Medication List - Protect others around you: Learn how to safely use, store and throw away your medicines at www.disposemymeds.org.          This list is accurate as of 12/5/18 11:36 AM.  Always use your most recent med list.                   Brand Name Dispense Instructions for use  Diagnosis    adalimumab 40 MG/0.8ML pen kit    HUMIRA PEN    2 each    Inject 0.8 mLs (40 mg) Subcutaneous every 14 days    Spondyloarthritis, High risk medication use, Uveitis       atropine 1 % ophthalmic solution     5 mL    Place 1 drop Into the left eye 3 times daily    Iritis       oxyCODONE-acetaminophen 5-325 MG tablet    PERCOCET    12 tablet    Take 1 tablet by mouth every 6 hours as needed for moderate to severe pain or severe pain        prednisoLONE acetate 1 % ophthalmic suspension    PRED FORTE    1 Bottle    Place 1 drop into both eyes 4 times daily    Iritis       predniSONE 10 MG tablet    DELTASONE    60 tablet    Take 2 tablets (20 mg) by mouth 2 times daily    Intermediate uveitis, unspecified laterality

## 2018-12-05 NOTE — PROGRESS NOTES
Current Eye Medications:  Prednisolone four times a day left eye. Ran out of atropine 1 month ago.     Subjective:  6 month Iritis follow up. Vision is OK with glasses, poor with out glasses. Patient feels he is more reliant on glasses. Left eye has a aching and burning pain (pain scale 9/10 when it is bad). When pain gets bad, patient has to sit in dark room.     Not on systemic meds.  No significant joint pain at present.  No symptoms right eye.     Objective:  See Ophthalmology Exam.       Assessment:  Moderate pain and inflammation in blind left eye off meds.      Plan:  Continue using Prednisolone left eye four times daily,  And Atropine left eye two to three times daily.  Wait at least 5 minutes between drops if using more than one at a time.  Recommend scheduling an appointment with Dr. Rodriguez.   Return visit in 6 months for complete exam.    Weston Delgado M.D.  [phone]

## 2018-12-05 NOTE — PATIENT INSTRUCTIONS
Continue using Prednisolone left eye four times daily,  And Atropine left eye two to three times daily.  Wait at least 5 minutes between drops if using more than one at a time.  Recommend scheduling an appointment with Dr. Rodriguez.   Return visit in 6 months for complete exam.    Weston Delgado M.D.  [phone]

## 2018-12-07 ENCOUNTER — TELEPHONE (OUTPATIENT)
Dept: OPHTHALMOLOGY | Facility: CLINIC | Age: 28
End: 2018-12-07

## 2018-12-07 DIAGNOSIS — H20.9 IRITIS: Primary | ICD-10-CM

## 2018-12-07 RX ORDER — CYCLOPENTOLATE HYDROCHLORIDE 20 MG/ML
1 SOLUTION/ DROPS OPHTHALMIC 2 TIMES DAILY
Qty: 5 ML | Refills: 3 | Status: SHIPPED | OUTPATIENT
Start: 2018-12-07 | End: 2020-01-23

## 2018-12-07 NOTE — TELEPHONE ENCOUNTER
Fax received from pharmacy stating atropine 1 % ophthalmic solution is on back order until 12/31/18.

## 2019-01-31 ENCOUNTER — DOCUMENTATION ONLY (OUTPATIENT)
Dept: OPHTHALMOLOGY | Facility: CLINIC | Age: 29
End: 2019-01-31

## 2019-02-12 ENCOUNTER — TELEPHONE (OUTPATIENT)
Dept: OPHTHALMOLOGY | Facility: CLINIC | Age: 29
End: 2019-02-12

## 2019-02-12 DIAGNOSIS — H20.9 IRITIS: ICD-10-CM

## 2019-02-12 RX ORDER — PREDNISOLONE ACETATE 10 MG/ML
1 SUSPENSION/ DROPS OPHTHALMIC 4 TIMES DAILY
Qty: 3 BOTTLE | Refills: 1 | Status: SHIPPED | OUTPATIENT
Start: 2019-02-12 | End: 2019-12-09

## 2019-02-12 NOTE — TELEPHONE ENCOUNTER
Called [facility] and they are out of Prednisolone until 3-9-19.  They have two bottles of Pred Forte, but it is set aside for someone else.    Called [facility] pharmacy across the street and they have some in stock.  Will send over Rx to them and have him pick them up.  I called patient and he will refill at [facility].

## 2019-02-12 NOTE — TELEPHONE ENCOUNTER
Reason for Call:  Other prescription    Detailed comments: patient states he is out of    prednisoLONE acetate (PRED FORTE) 1 % ophthalmic suspension     And has been to several [facility] in the area and they are stating the medication is on backorder. Patient would like a refill of this and to know where its not on backorder. Please call to discuss thank you.     Phone Number Patient can be reached at: Home number on file [phone] (home)    Best Time: any    Can we leave a detailed message on this number? YES    Call taken on 2/12/2019 at 10:17 AM by JANET BOSCH

## 2019-11-07 ENCOUNTER — HEALTH MAINTENANCE LETTER (OUTPATIENT)
Age: 29
End: 2019-11-07

## 2019-12-09 ENCOUNTER — TELEPHONE (OUTPATIENT)
Dept: OPHTHALMOLOGY | Facility: CLINIC | Age: 29
End: 2019-12-09

## 2019-12-09 DIAGNOSIS — H20.9 IRITIS: ICD-10-CM

## 2019-12-09 RX ORDER — PREDNISOLONE ACETATE 10 MG/ML
1 SUSPENSION/ DROPS OPHTHALMIC 4 TIMES DAILY
Qty: 1 BOTTLE | Refills: 0 | Status: SHIPPED | OUTPATIENT
Start: 2019-12-09 | End: 2020-01-23

## 2019-12-09 RX ORDER — PREDNISOLONE ACETATE 10 MG/ML
1 SUSPENSION/ DROPS OPHTHALMIC 4 TIMES DAILY
Qty: 1 BOTTLE | Refills: 0 | Status: SHIPPED | OUTPATIENT
Start: 2019-12-09 | End: 2019-12-09

## 2019-12-09 NOTE — TELEPHONE ENCOUNTER
Called patient and informed him that Dr. Delgado is in surgery this afternoon, but will be signing the approval of his drop later today.

## 2019-12-09 NOTE — TELEPHONE ENCOUNTER
[facility] pharmacy called regarding RX Prednisolone. Pharmacy indicates they have not received RX from  yet. Patient came to . Pharmacy phone number [phone].

## 2019-12-09 NOTE — TELEPHONE ENCOUNTER
Last appointment 12-5-18.  No future appointments scheduled.    Dr. Delgado approves one refill and requests an appointment for any additional refills.   Message added to refill approval to schedule an appointment with Dr. Delgado.

## 2019-12-09 NOTE — TELEPHONE ENCOUNTER
Kirill was calling indicating RX that Dr. Delgado filled from appointment today has not been filled. Read notes from [facility] indicating it was switched from [facility] to [facility]. Informed Kirill of the change. He wants a return phone call with confirmation this has been completed. Please call Kirill at [phone].

## 2019-12-09 NOTE — TELEPHONE ENCOUNTER
First Rx was sent to [facility].  Will resend to [facility] [address] as they are calling for the Rx.

## 2020-01-23 ENCOUNTER — OFFICE VISIT (OUTPATIENT)
Dept: OPHTHALMOLOGY | Facility: CLINIC | Age: 30
End: 2020-01-23
Payer: COMMERCIAL

## 2020-01-23 DIAGNOSIS — H30.22 INTERMEDIATE UVEITIS, LEFT: ICD-10-CM

## 2020-01-23 DIAGNOSIS — H20.9 IRITIS: ICD-10-CM

## 2020-01-23 DIAGNOSIS — Z01.01 ENCOUNTER FOR EXAMINATION OF EYES AND VISION WITH ABNORMAL FINDINGS: Primary | ICD-10-CM

## 2020-01-23 DIAGNOSIS — H21.42: ICD-10-CM

## 2020-01-23 DIAGNOSIS — Z96.1 PSEUDOPHAKIA: ICD-10-CM

## 2020-01-23 DIAGNOSIS — H33.42 TRACTION DETACHMENT OF RETINA, LEFT EYE: ICD-10-CM

## 2020-01-23 DIAGNOSIS — H52.02 HYPEROPIA OF LEFT EYE: ICD-10-CM

## 2020-01-23 PROCEDURE — 92014 COMPRE OPH EXAM EST PT 1/>: CPT | Performed by: OPHTHALMOLOGY

## 2020-01-23 PROCEDURE — 92015 DETERMINE REFRACTIVE STATE: CPT | Performed by: OPHTHALMOLOGY

## 2020-01-23 ASSESSMENT — VISUAL ACUITY
METHOD: SNELLEN - LINEAR
CORRECTION_TYPE: GLASSES
OD_CC: 20/20

## 2020-01-23 ASSESSMENT — CONF VISUAL FIELD
OS_SUPERIOR_TEMPORAL_RESTRICTION: 1
OS_INFERIOR_NASAL_RESTRICTION: 1
OD_NORMAL: 1
OS_INFERIOR_TEMPORAL_RESTRICTION: 1
OS_SUPERIOR_NASAL_RESTRICTION: 1

## 2020-01-23 ASSESSMENT — EXTERNAL EXAM - RIGHT EYE: OD_EXAM: NORMAL

## 2020-01-23 ASSESSMENT — TONOMETRY
OD_IOP_MMHG: 12
OS_IOP_MMHG: 10
IOP_METHOD: ICARE

## 2020-01-23 ASSESSMENT — SLIT LAMP EXAM - LIDS
COMMENTS: NORMAL
COMMENTS: NORMAL

## 2020-01-23 ASSESSMENT — REFRACTION_WEARINGRX
OD_AXIS: 090
OD_SPHERE: +1.00
SPECS_TYPE: SVL
OS_CYLINDER: SPHERE
OD_CYLINDER: +0.75
OS_SPHERE: PLANO

## 2020-01-23 ASSESSMENT — REFRACTION_MANIFEST
OD_AXIS: 090
OD_SPHERE: +0.75
OS_SPHERE: PLANO
OD_CYLINDER: +1.00
OS_CYLINDER: SPHERE

## 2020-01-23 ASSESSMENT — EXTERNAL EXAM - LEFT EYE: OS_EXAM: NORMAL

## 2020-01-23 ASSESSMENT — CUP TO DISC RATIO: OD_RATIO: 0.3

## 2020-01-23 NOTE — PATIENT INSTRUCTIONS
Continue Prednisolone 3-4 times daily left eye. May use in the right eye if necessary.  Glasses Rx given - optional   Signs and symptoms of retinal detachment (shower of black floaters, frequent flashing even during day, curtain over part of visual field) discussed.  Return visit 6 months for intraocular pressure check.    Weston Delgado M.D.  [phone]

## 2020-01-23 NOTE — LETTER
1/23/2020         RE: Kirill Butt  [address] [facility] [address]        Dear Colleague,    Thank you for referring your patient, Kirill Butt, to the [facility]. Please see a copy of my visit note below.     Current Eye Medications:  Prednisolone 3-4 times daily in LE.     Subjective:  PT here for flashes in RE x 1 week. Stable floaters. PT wants new glasses.    Hasn't used Atropine left eye as it was back ordered.  No significant pain left eye.  Systemically, occasional Ibuprofen but otherwise joints doing well.      Objective:  See Ophthalmology Exam.       Assessment:  No significant iritis right eye; no retinal tear or retinal detachment to explain recent photopsia.  Blind left eye without pain.      ICD-10-CM    1. Encounter for examination of eyes and vision with abnormal findings Z01.01 REFRACTIVE STATUS   2. Hyperopia of left eye H52.02 REFRACTIVE STATUS   3. Iritis, ou H20.9 prednisoLONE acetate (PRED FORTE) 1 % ophthalmic suspension     EYE EXAM (SIMPLE-NONBILLABLE)   4. 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 Pseudophakia, os Z96.1    7. 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 May use in the right eye if necessary.  Glasses Rx given - optional   Signs and symptoms of retinal detachment (shower of black floaters, frequent flashing even during day, curtain over part of visual field) discussed.  Return visit 6 months for intraocular pressure check.    Weston Delgado M.D.  [phone]          Again, thank you for allowing me to participate in the care of your patient.        Sincerely,        Weston Delgado MD

## 2020-01-23 NOTE — PROGRESS NOTES
Current Eye Medications:  Prednisolone 3-4 times daily in LE.     Subjective:  PT here for flashes in RE x 1 week. Stable floaters. PT wants new glasses.    Hasn't used Atropine left eye as it was back ordered.  No significant pain left eye.  Systemically, occasional Ibuprofen but otherwise joints doing well.      Objective:  See Ophthalmology Exam.       Assessment:  No significant iritis right eye; no retinal tear or retinal detachment to explain recent photopsia.  Blind left eye without pain.      ICD-10-CM    1. Encounter for examination of eyes and vision with abnormal findings Z01.01 REFRACTIVE STATUS   2. Hyperopia of left eye H52.02 REFRACTIVE STATUS   3. Iritis, ou H20.9 prednisoLONE acetate (PRED FORTE) 1 % ophthalmic suspension     EYE EXAM (SIMPLE-NONBILLABLE)   4. 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 Pseudophakia, os Z96.1    7. 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 May use in the right eye if necessary.  Glasses Rx given - optional   Signs and symptoms of retinal detachment (shower of black floaters, frequent flashing even during day, curtain over part of visual field) discussed.  Return visit 6 months for intraocular pressure check.    Weston Delgado M.D.  [phone]

## 2020-01-25 RX ORDER — PREDNISOLONE ACETATE 10 MG/ML
1 SUSPENSION/ DROPS OPHTHALMIC 4 TIMES DAILY
Qty: 1 BOTTLE | Refills: 4 | Status: SHIPPED | OUTPATIENT
Start: 2020-01-25 | End: 2020-10-01

## 2020-10-01 DIAGNOSIS — H20.9 IRITIS: ICD-10-CM

## 2020-10-01 RX ORDER — PREDNISOLONE ACETATE 10 MG/ML
1 SUSPENSION/ DROPS OPHTHALMIC 4 TIMES DAILY
Qty: 1 BOTTLE | Refills: 4 | Status: SHIPPED | OUTPATIENT
Start: 2020-10-01 | End: 2021-08-13

## 2020-10-01 NOTE — TELEPHONE ENCOUNTER
Received refill request for Prednisolone. Needs to make appt for IOP check, will have pharmacy remind him.   Sent to Dr. Delgado to sign and send.

## 2020-11-29 ENCOUNTER — HEALTH MAINTENANCE LETTER (OUTPATIENT)
Age: 30
End: 2020-11-29

## 2021-06-08 ENCOUNTER — OFFICE VISIT (OUTPATIENT)
Dept: OPHTHALMOLOGY | Facility: CLINIC | Age: 31
End: 2021-06-08

## 2021-06-08 DIAGNOSIS — Z98.890 HISTORY OF YAG LASER IRIDOTOMY OF LEFT EYE: ICD-10-CM

## 2021-06-08 DIAGNOSIS — Z96.1 PSEUDOPHAKIA: ICD-10-CM

## 2021-06-08 DIAGNOSIS — H33.42 TRACTION DETACHMENT OF RETINA, LEFT EYE: ICD-10-CM

## 2021-06-08 DIAGNOSIS — H30.22 INTERMEDIATE UVEITIS, LEFT: Primary | ICD-10-CM

## 2021-06-08 DIAGNOSIS — H21.42: ICD-10-CM

## 2021-06-08 DIAGNOSIS — Z98.890 HISTORY OF VITRECTOMY: ICD-10-CM

## 2021-06-08 PROCEDURE — 92012 INTRM OPH EXAM EST PATIENT: CPT | Performed by: OPHTHALMOLOGY

## 2021-06-08 RX ORDER — SODIUM CHLORIDE 5 %
OINTMENT (GRAM) OPHTHALMIC (EYE)
Qty: 3.5 G | Refills: 11 | Status: SHIPPED | OUTPATIENT
Start: 2021-06-08 | End: 2021-10-18

## 2021-06-08 ASSESSMENT — TONOMETRY
OS_IOP_MMHG: 14
IOP_METHOD: TONOPEN
OD_IOP_MMHG: 14

## 2021-06-08 ASSESSMENT — EXTERNAL EXAM - LEFT EYE: OS_EXAM: NORMAL

## 2021-06-08 ASSESSMENT — VISUAL ACUITY
METHOD: ALLEN CARDS
OD_SC: 20/20

## 2021-06-08 ASSESSMENT — EXTERNAL EXAM - RIGHT EYE: OD_EXAM: NORMAL

## 2021-06-08 ASSESSMENT — SLIT LAMP EXAM - LIDS
COMMENTS: 2+ PTOSIS
COMMENTS: NORMAL

## 2021-06-08 NOTE — PROGRESS NOTES
Current Eye Medications: prednisolone four times a day left eye     Subjective:  Pain left eye and very light sensitive  As above for the past 6 days, seems to be better over past two days, this eye has a pressure feeling in it.    Systemic arthritis mostly quiescent at present; off Humira.     Objective:  See Ophthalmology Exam.       Assessment:  Stable appearance to left eye with mild iritis, mostly flat anterior chamber.  Prephthisical symptoms?      Plan:  Continue Prednisolone three times daily left eye   Start Tammie 128 ointment: One squirt to left eye 2-3 times a day.  Will refer to [facility] Uveitis service for evaluation and treatment if indicated.  Weston Delgado M.D.  [phone]

## 2021-06-08 NOTE — PATIENT INSTRUCTIONS
Continue Prednisolone three times daily left eye   Start Tammie 128 ointment: One squirt to left eye 2-3 times a day.  We will have the [facility] call you to set up an appointment.  Return visit as needed     Weston Delgado M.D.  [phone]

## 2021-06-08 NOTE — LETTER
[address]  [place] [address]  Phone: [phone]    06/08/21    Kirill Butt  [address] [place] [address]      To whom it may concern:     Kirill was seen in our eye clinic today for severe pain and light sensitivity due to chronic inflammation in his left eye.  He should be excused from his recent work absences because of this problem.    Please contact me if any questions in this regard,    Sincerely,      Weston Delgado MD  [phone]

## 2021-07-07 ENCOUNTER — OFFICE VISIT (OUTPATIENT)
Dept: OPHTHALMOLOGY | Facility: CLINIC | Age: 31
End: 2021-07-07
Attending: OPHTHALMOLOGY

## 2021-07-07 DIAGNOSIS — H33.42 TRACTION DETACHMENT OF RETINA, LEFT EYE: ICD-10-CM

## 2021-07-07 DIAGNOSIS — Z96.1 PSEUDOPHAKIA OF LEFT EYE: ICD-10-CM

## 2021-07-07 DIAGNOSIS — Z98.890 HISTORY OF VITRECTOMY: ICD-10-CM

## 2021-07-07 DIAGNOSIS — H18.232 CORNEAL EDEMA, SECONDARY, LEFT: ICD-10-CM

## 2021-07-07 DIAGNOSIS — Z79.899 HIGH RISK MEDICATION USE: ICD-10-CM

## 2021-07-07 DIAGNOSIS — H30.23 INTERMEDIATE UVEITIS, BILATERAL: Primary | ICD-10-CM

## 2021-07-07 DIAGNOSIS — H21.42: ICD-10-CM

## 2021-07-07 PROCEDURE — G0463 HOSPITAL OUTPT CLINIC VISIT: HCPCS

## 2021-07-07 PROCEDURE — 92134 CPTRZ OPH DX IMG PST SGM RTA: CPT | Performed by: OPHTHALMOLOGY

## 2021-07-07 PROCEDURE — 99214 OFFICE O/P EST MOD 30 MIN: CPT | Performed by: OPHTHALMOLOGY

## 2021-07-07 PROCEDURE — 76512 OPH US DX B-SCAN: CPT | Performed by: OPHTHALMOLOGY

## 2021-07-07 ASSESSMENT — CONF VISUAL FIELD
OS_SUPERIOR_NASAL_RESTRICTION: 1
OS_SUPERIOR_TEMPORAL_RESTRICTION: 1
OS_INFERIOR_NASAL_RESTRICTION: 1
OD_NORMAL: 1
OS_INFERIOR_TEMPORAL_RESTRICTION: 1
METHOD: COUNTING FINGERS

## 2021-07-07 ASSESSMENT — REFRACTION_WEARINGRX
SPECS_TYPE: SVL
OS_CYLINDER: SPHERE
OD_CYLINDER: +0.75
OD_SPHERE: +1.00
OD_AXIS: 090
OS_SPHERE: PLANO

## 2021-07-07 ASSESSMENT — EXTERNAL EXAM - RIGHT EYE: OD_EXAM: NORMAL

## 2021-07-07 ASSESSMENT — SLIT LAMP EXAM - LIDS
COMMENTS: NORMAL
COMMENTS: NORMAL

## 2021-07-07 ASSESSMENT — TONOMETRY
OS_IOP_MMHG: 15
OD_IOP_MMHG: 15
IOP_METHOD: TONOPEN

## 2021-07-07 ASSESSMENT — CUP TO DISC RATIO: OD_RATIO: 0.4

## 2021-07-07 ASSESSMENT — VISUAL ACUITY
OD_CC: 20/20
METHOD: SNELLEN - LINEAR
CORRECTION_TYPE: GLASSES

## 2021-07-07 NOTE — LETTER
July 7, 2021      Re: Kirill Butt   1990    To Whom It May Concern:    This is to confirm that the above patient was seen on 7/7/2021.  Kirill Butt is able to return to work today but please kindly excuse him being late due to our long appointment.    Thank you for your cooperation in this matter.  Please do not hesitate to contact me if you have any further questions.    Sincerely,      LUCIEN NUNEZ

## 2021-07-07 NOTE — NURSING NOTE
Chief Complaints and History of Present Illnesses   Patient presents with     Uveitis Evaluation     Chief Complaint(s) and History of Present Illness(es)     Uveitis Evaluation     Laterality: left eye    Onset: gradual    Onset: months ago    Quality: States little va in the left eye for the past 6 yrs      Severity: moderate    Frequency: intermittently    Associated symptoms: photophobia (with flare ups).  Negative for floaters and flashes    Pain scale: 0/10              Comments     Here for Intermediate uveitis, left eye first flare at age 12/13.  Recently they have not been able to get the flare ups under control.  Tammie 128 ointment has not started  prednisolone 3-4 times daily left eye  Angeles Monsalve COT 8:10 AM July 7, 2021

## 2021-07-07 NOTE — LETTER
7/7/2021       RE: Kirill Butt  [address] [facility] [address]     Dear Colleague,    Thank you for referring your patient, Kirill Butt, to the [facility] at [facility]. Please see a copy of my visit note below.    Chief Complaint/Presenting Concern: Uveitis evaluation    History of Present Illness:   Kirill Butt is a 31 year old patient who presents for evaluation of uveitis from Dr. Weston Delgado.    The history started suddenly upon awakening one morning in the 8th grade. Things started with watering and light sensitivity. This has been on and off over the last 20 years. Treatment has included primarily drops but also kenalog injections, prednisone pills briefly, methotrexate for one year and Humira for six months.     The first surgery was in summer 2015 and then later in the fall in 2015. The first surgery was to remove the cataract in the left eye. Things did improve with peripheral vision but then with the second retina surgery, the vision did not improve. Since then, there have not been any surgeries.    The right eye may have had inflammation flares but has not needed regular drops, no injections, and no surgeries. Kirill reports the right eye gets red occasionally but no flares in a long time. He reports the vision has been poor for some time in the left eye with redness coming and going. He is interested in finding out if the right eye is okay and if anything can be done for left eye    Additional Ocular History:   1. Cataract extraction with intraocular lens placement left eye summer 2015  2.  History of vitrectomy left eye Fall 2015 including Traction membrane peeling and silicone oil placement  3.  Iris bombe, s/p iridotomy with subsequent corectopia  4.  Secondary corneal edema left eye  5.  No history of amblyopia      Relevant Past Medical/Family/Social History:  1. History of joint inflammation as early as teenage years with  some hand cartilage damage.   2. Previously used Humira for around 6 months several years ago in 2017, which was stopped due to insurance/job changes. Previously saw rheumatologist at [facility] in [place] who diagnosed Rheumatoid Arthritis. Previously also used Methotrexate for one year    Works for [employer] as . No family history of eye conditions.     Relevant Review of Systems: Right knee pain, some left Achilles pain. No abdomina pain, back pain, no rashes. Intermittent back pain, sometimes associated with work    Laboratory Testing (Reviewed from 2016)  CMP WNL, CBC, Quantiferon, Rheumatoid Factor, CCP, ESR, CRP, Creatinine     Current eye related medications: Prednisolone 3-4x/day left eye, has not started Tammie ointment left eye yet    Retina/Uveitis Imaging:  OCT Spectralis Macula July 7, 2021  right eye: Improved media, normal contour, no fluid. Improved vs 2011  left eye: No view    B-scan ultrasound July 7, 2021:  left eye: Silicone oil filled so limited resolution, grossly attached    Assessment:    1. Intermediate uveitis, bilateral  The left eye has active anterior chamber cell. There is no view to the fundus and B-scan ultrasound limited due to silicone oil placement.    Prior inflammation right eye, no signs of active anterior/intermediate uveitis and no CME in the right eye  But there are some inferior scars and opacities but no haze    2. Traction detachment of retina, left eye  Treated with vitrectomy in the left eye     3. History of vitrectomy - Left Eye  With silicone oil fill, which is still present. There is early corneal edema peripherally without giancarlo band keratopathy.    4. Iris bombe of left eye  With secluded, correctopic pupil which is inferiorly displaced    5. Corneal edema, secondary, left  Partially secondary to irido-corneal touch.     6. Pseudophakia of left eye  Present since 2015      Plan/Recommendations:    Discussed findings with patient.The right eye has  evidence of old anterior uveitis and intermediate uveitis but no vitreous haze and no cystoid macular edema by OCT. As such, there is no active uveitis in the right eye to necessitate any treatment at this time.     We spent a good period of time reviewing the history of inflammation and treatment in the left eye. Given longstanding vision in the left eye as well as esotropia (eye turning in), it is unclear if pursuing more aggressive treatment of the uveitis in the left eye (for example, with steroid injections), would provide significant benefit. We also discussed possible consultation with one of my Retina Surgery Colleagues to discuss silicone oil removal in order to view the retina. Kirill will consider and let me know    Do not recommend additional diagnostic testing at this time    Continue the steroid drop (prednisolone) 3-4x/day in the left eye for comfort moreso than to try to eliminate uveitis completely.    Start that Tammie ointment at least once daily in the left eye, twice daily if possible to help with corneal edema    Consider lubricating drops over the counter such as Refresh or Systane to help with eye dryness.    We placed a consult to Rheumatology and they should call you for an appointment to discuss treatment such as Methotrexate or Humira which can help with inflammation control of the joints as well as the eyes. It is fortunate that there is no active uveitis in the right eye but given there is a history of uveitis in the that eye, consideration of systemic treatment might help prevent recurrence in the right eye     RTC   1. Rheumatology consult to discuss systemic control of rheumatoid arthritis and help reduce risk of uveitis flare in the right eye     2. 3 months , Tonopen, dilate right eye only no testing    Physician Attestation     Attending Physician Attestation:  Complete documentation of historical and exam elements from today's encounter can be found in the full encounter summary  report (not reduplicated in this progress note). I personally obtained the chief complaint(s) and history of present illness. I confirmed and edited as necessary the review of systems, past medical/surgical history, family history, social history, and examination findings as documented by others; and I examined the patient myself. I personally reviewed the relevant tests, images, and reports as documented above. I formulated and edited as necessary the assessment and plan and discussed the findings and management plan with the patient and any family members present at the time of the visit.  Kaiser Aldana M.D., Uveitis and Medical Retina, July 7, 2021     Sincerely,    Kaiser Aldana MD  [facility] Dept of Ophthalmology  Uveitis and Medical Retina

## 2021-07-07 NOTE — PATIENT INSTRUCTIONS
Continue the steroid drop 3-4x/day in the left eye     Start that Tammie ointment at least once daily in the left eye, twice daily if possible    Consider lubricating drops over the counter such as Refresh or Systane to help with eye dryness.    We placed a consult to Rheumatology and they should call you for an appointment    We will see you back in 3 months for a check up. Call/message us for any questions.

## 2021-07-07 NOTE — PROGRESS NOTES
Chief Complaint/Presenting Concern: Uveitis evaluation    History of Present Illness:   Kirill Butt is a 31 year old patient who presents for evaluation of uveitis from Dr. Weston Delgado.    The history started suddenly upon awakening one morning in the 8th grade. Things started with watering and light sensitivity. This has been on and off over the last 20 years. Treatment has included primarily drops but also kenalog injections, prednisone pills briefly, methotrexate for one year and Humira for six months.     The first surgery was in summer 2015 and then later in the Fall in 2015. The first surgery was to remove the cataract in the left eye. Things did improve with peripheral vision but then with the second retina surgery, the vision did not improve. Since then, there have not been any surgeries.    The right eye may have had inflammation flares but has not needed regular drops, no injections, and no surgeries. Kirill reports the right eye gets red occasionally but no flares in a long time. He reports the vision has been poor for some time in the left eye with redness coming and going. He is interested in finding out if the right eye is okay and if anything can be done for left eye    Additional Ocular History:   1. Cataract extraction with intraocular lens placement left eye Summer 2015  2.  History of vitrectomy left eye Fall 2015 including Traction membrane peeling and silicone oil placement  3.  Iris bombe, s/p iridotomy with subsequent corectopia  4.  Secondary corneal edema left eye  5.  No history of amblyopia    Relevant Past Medical/Family/Social History:  1. History of joint inflammation as early as teenage years with some hand cartilage damage.   2. Previously used Humira for around 6 months several years ago in 2017, which was stopped due to insurance/job changes. Previously saw Rheumatologist at [facility] in [place] who diagnosed Rheumatoid Arthritis. Previously also used Methotrexate for one  year    Works for [employer] as . No family history of eye conditions.     Relevant Review of Systems: Right knee pain, some left Achilles pain. No abdomina pain, back pain, no rashes. Intermittent back pain, sometimes associated with work    Laboratory Testing (Reviewed from 2016)  CMP WNL, CBC, Quantiferon, Rheumatoid Factor, CCP, ESR, CRP, Creatinine     Current eye related medications: Prednisolone 3-4x/day left eye, has not started Tammie ointment left eye yet    Retina/Uveitis Imaging:  OCT Spectralis Macula July 7, 2021  right eye: Improved media, normal contour, no fluid. Improved vs 2011  left eye: No view    B-scan ultrasound July 7, 2021:  left eye: Silicone oil filled so limited resolution, grossly attached    Assessment:    1. Intermediate uveitis, bilateral  The left eye has active anterior chamber cell. There is no view to the fundus and B-scan ultrasound limited due to silicone oil placement.    Prior inflammation right eye, no signs of active anterior/intermediate uveitis and no CME in the right eye  But there are some inferior scars and opacities but no haze    2. Traction detachment of retina, left eye  Treated with vitrectomy in the left eye     3. History of vitrectomy - Left Eye  With silicone oil fill, which is still present. There is early corneal edema peripherally without giancarlo band keratopathy.    4. Iris bombe of left eye  With secluded, correctopic pupil which is inferiorly displaced    5. Corneal edema, secondary, left  Partially secondary to irido-corneal touch.     6. Pseudophakia of left eye  Present since 2015    Plan/Recommendations:    Discussed findings with patient.The right eye has evidence of old anterior uveitis and intermediate uveitis but no vitreous haze and no cystoid macular edema by OCT. As such, there is no active uveitis in the right eye to necessitate any treatment at this time.     We spent a good period of time reviewing the history of inflammation and  treatment in the left eye. Given longstanding vision in the left eye as well as esotropia (eye turning in), it is unclear if pursuing more aggressive treatment of the uveitis in the left eye (for example, with steroid injections), would provide significant benefit. We also discussed possible consultation with one of my Retina Surgery Colleagues to discuss silicone oil removal in order to view the retina. Kirill will consider and let me know      Do not recommend additional diagnostic testing at this time    Continue the steroid drop (prednisolone) 3-4x/day in the left eye for comfort moreso than to try to eliminate uveitis completely.    Start that Tammie ointment at least once daily in the left eye, twice daily if possible to help with corneal edema    Consider lubricating drops over the counter such as Refresh or Systane to help with eye dryness.    We placed a consult to Rheumatology and they should call you for an appointment to discuss treatment such as Methotrexate or Humira which can help with inflammation control of the joints as well as the eyes. It is fortunate that there is no active uveitis in the right eye but given there is a history of uveitis in the that eye, consideration of systemic treatment might help prevent recurrence in the right eye     RTC   1. Rheumatology consult to discuss systemic control of Rheumatoid arthritis and help reduce risk of uveitis flare in the right eye     2. 3 months , Tonopen, dilate right eye only no testing    Physician Attestation     Attending Physician Attestation:  Complete documentation of historical and exam elements from today's encounter can be found in the full encounter summary report (not reduplicated in this progress note). I personally obtained the chief complaint(s) and history of present illness. I confirmed and edited as necessary the review of systems, past medical/surgical history, family history, social history, and examination findings as documented by  others; and I examined the patient myself. I personally reviewed the relevant tests, images, and reports as documented above. I formulated and edited as necessary the assessment and plan and discussed the findings and management plan with the patient and any family members present at the time of the visit.  Kaiser Aldana M.D., Uveitis and Medical Retina, July 7, 2021

## 2021-08-13 DIAGNOSIS — H20.9 IRITIS: ICD-10-CM

## 2021-08-13 RX ORDER — PREDNISOLONE ACETATE 10 MG/ML
1 SUSPENSION/ DROPS OPHTHALMIC 4 TIMES DAILY
Qty: 15 ML | Refills: 11 | Status: SHIPPED | OUTPATIENT
Start: 2021-08-13 | End: 2021-10-18

## 2021-08-13 NOTE — TELEPHONE ENCOUNTER
Prednisolone refill requested. Will send for him to [facility]. He saw the Uveitis doctor and was told to keep using the drops 3-4 times a day in the left eye.

## 2021-09-25 ENCOUNTER — HEALTH MAINTENANCE LETTER (OUTPATIENT)
Age: 31
End: 2021-09-25

## 2021-10-14 ENCOUNTER — TELEPHONE (OUTPATIENT)
Dept: OPHTHALMOLOGY | Facility: CLINIC | Age: 31
End: 2021-10-14

## 2021-10-14 ENCOUNTER — OFFICE VISIT (OUTPATIENT)
Dept: OPHTHALMOLOGY | Facility: CLINIC | Age: 31
End: 2021-10-14
Attending: OPHTHALMOLOGY
Payer: COMMERCIAL

## 2021-10-14 DIAGNOSIS — H30.23 INTERMEDIATE UVEITIS, BILATERAL: Primary | ICD-10-CM

## 2021-10-14 DIAGNOSIS — H57.12 BLIND PAINFUL LEFT EYE: ICD-10-CM

## 2021-10-14 DIAGNOSIS — H54.40 BLIND PAINFUL LEFT EYE: ICD-10-CM

## 2021-10-14 PROCEDURE — 92012 INTRM OPH EXAM EST PATIENT: CPT | Mod: GC | Performed by: OPHTHALMOLOGY

## 2021-10-14 PROCEDURE — G0463 HOSPITAL OUTPT CLINIC VISIT: HCPCS

## 2021-10-14 PROCEDURE — 76512 OPH US DX B-SCAN: CPT | Performed by: OPHTHALMOLOGY

## 2021-10-14 RX ORDER — PREDNISOLONE ACETATE 10 MG/ML
1-2 SUSPENSION/ DROPS OPHTHALMIC 4 TIMES DAILY
Qty: 5 ML | Refills: 0 | Status: SHIPPED | OUTPATIENT
Start: 2021-10-14 | End: 2021-10-18

## 2021-10-14 RX ORDER — ATROPINE SULFATE 10 MG/ML
1-2 SOLUTION/ DROPS OPHTHALMIC 2 TIMES DAILY
Qty: 5 ML | Refills: 0 | Status: SHIPPED | OUTPATIENT
Start: 2021-10-14 | End: 2021-10-18

## 2021-10-14 RX ORDER — PREDNISONE 20 MG/1
20 TABLET ORAL 2 TIMES DAILY
Qty: 40 TABLET | Refills: 0 | Status: SHIPPED | OUTPATIENT
Start: 2021-10-14 | End: 2021-10-18

## 2021-10-14 ASSESSMENT — CONF VISUAL FIELD
OS_INFERIOR_TEMPORAL_RESTRICTION: 1
OS_SUPERIOR_TEMPORAL_RESTRICTION: 1
METHOD: COUNTING FINGERS
OS_SUPERIOR_NASAL_RESTRICTION: 1
OD_NORMAL: 1
OS_INFERIOR_NASAL_RESTRICTION: 1

## 2021-10-14 ASSESSMENT — VISUAL ACUITY
OD_CC: 20/20
OS_CC: NLP
METHOD: SNELLEN - LINEAR
OD_CC+: -1
CORRECTION_TYPE: GLASSES

## 2021-10-14 ASSESSMENT — SLIT LAMP EXAM - LIDS
COMMENTS: NORMAL
COMMENTS: NORMAL

## 2021-10-14 ASSESSMENT — REFRACTION_WEARINGRX
OD_AXIS: 090
OD_CYLINDER: +0.75
OD_SPHERE: +1.00
SPECS_TYPE: SVL
OS_SPHERE: PLANO
OS_CYLINDER: SPHERE

## 2021-10-14 ASSESSMENT — EXTERNAL EXAM - RIGHT EYE: OD_EXAM: NORMAL

## 2021-10-14 ASSESSMENT — TONOMETRY
IOP_METHOD: ICARE
OS_IOP_MMHG: 17
OD_IOP_MMHG: 16

## 2021-10-14 NOTE — TELEPHONE ENCOUNTER
Thank you for the note and for getting Ms. Butt scheduled with Dr. Durand today. I hope he will be doing better by next week.    Dr. ROCKWELL

## 2021-10-14 NOTE — NURSING NOTE
Chief Complaints and History of Present Illnesses   Patient presents with     Consult For     Chief Complaint(s) and History of Present Illness(es)     Consult For     Laterality: left eye    Onset: sudden    Onset: 1 day ago    Course: stable    Associated symptoms: eye pain, tearing, photophobia, redness, burning and headache.  Negative for dryness, flashes, floaters, discharge, itching and foreign body sensation    Treatments tried: eye drops and ointment    Pain scale: 10/10              Comments     Pt of Dr Aldana here for Uveitis flare up LE starting yesterday around 10 am.  LE NLP so no change to VA.  Pain, redness, tearing, headache, and light sensitivity getting worse.  Burning when putting prednisolone drops in.    Prednisolone LE 3-4x/day  Ointment PRN    NISHA Pires October 14, 2021 10:36 AM

## 2021-10-14 NOTE — LETTER
October 14, 2021      Re: Kirill Butt   1990    To Whom It May Concern:    This is to confirm that the above patient was seen on 10/14/2021.  Kirill Butt is able to return to work Saturday, 10/16/21, if his eye pain is improved by then and he can tolerate to do so.  He does have another appointment set for Monday, 10/18/21, in the afternoon to see us back for follow-up.    Thank you for your cooperation in this matter.  Please do not hesitate to contact me if you have any further questions.    Sincerely,    Electronically signed  JOSELYN GUZMAN

## 2021-10-14 NOTE — LETTER
10/14/2021       RE: Kirill Butt  [address] [facility] [address]     Dear Colleague,    Thank you for referring your patient, Kirill Butt, to the [facility] EYE [facility] at [facility]. Please see a copy of my visit note below.       CC:  Pain left eye   HPI: Kirill Butt is a  31 year old year-old patient with history of intermediate uveitis under the care of Dr Aldana. Used to previously follow Dr isaura Delgado.   In summer 2015, had cataract extraction left eye and in fall required retina surgery with oil placement.   Right eye has some sequela of inflammation but has been quite off drops.     Right knee pain, some left Achilles pain. No abdomina pain, back pain, no rashes. Intermittent back pain, sometimes associated with work. Was previously on Methotrexate and Humira but nothing in the last 3-4 years.     Works for [employer] as . No family history of eye conditions.     He presents with one day history of deep eye pain that is preventing him from doing work, sleeping, etc. Light sensitivity along with eye pain.      Laboratory Testing (Reviewed from 2016)  CMP WNL, CBC, Quantiferon, Rheumatoid Factor, CCP, ESR, CRP, Creatinine     Current eye related medications: Prednisolone 3-4x/day left eye    Additional Ocular History:   1. Cataract extraction with intraocular lens placement left eye Summer 2015  2.  History of vitrectomy left eye Fall 2015 including Traction membrane peeling and silicone oil placement  3.  Iris bombe, s/p iridotomy with subsequent corectopia  4.  Secondary corneal edema left eye  5.  No history of amblyopia    Assessment & Plan:    Retina/Uveitis Imaging:  OCT Spectralis Macula July 7, 2021  right eye: Improved media, normal contour, no fluid. Improved vs 2011  left eye: No view     B-scan ultrasound July 7, 2021:  left eye: Silicone oil filled so limited resolution, grossly attached     Assessment/Plan:    #blind  painful eye  Eye painful to touch and injected  Possible scleritis  - NLP vision on exam today with eye pain   - PO prednisone 40mg, oral NSAID  - defer silicone oil removal as an option  - PF drops q hr, atropine bid  - If persistent pain consider plastics eval for enucleation for blind painful eye  - follow up with Dr Aldana next week    # Intermediate uveitis, bilateral    # history of Traction detachment of retina, left eye  Treated with vitrectomy in the left eye      # History of vitrectomy - Left Eye  With silicone oil fill, which is still present. There is early corneal edema peripherally without giancarlo band keratopathy.     # Iris bombe of left eye  With secluded, correctopic pupil which is inferiorly displaced     # Corneal edema, secondary, left  Partially secondary to irido-corneal touch.      # Pseudophakia of left eye  Present since 2015    PO pred 40mg, os topical PF q hr, os atorpine bid, rheumatology referral, follow up kylie Aldana       Again, thank you for allowing me to participate in the care of your patient.      Sincerely,    Tisha Durand MD

## 2021-10-14 NOTE — TELEPHONE ENCOUNTER
Scheduled with Dr. Bowen today at 10 AM    Pt aware of date/time/location at [facility]    Ovidio Doyle RN 9:45 AM 10/14/21    ---        Spoke to pt at 0919    New left symptoms starting yesterday    New redness in left eye    Pain around eye/pressure on eye    Increase tearing    New photophobia    Vision about the same    No new floaters    Pt using prednisolone left eye 3-4/day consistently prior to yesterdays. Yesterday/Today stinging with use.    No artificial tears no shasta drops    No non-steroidal treatments for uveitis currently in use.    Will review plan of care with on call eye provider (Dr. aldana not in clinic today)    Ovidio Doyle RN 9:24 AM 10/14/21                     [facility] Call Center    Phone Message    May a detailed message be left on voicemail: yes     Reason for Call: Symptoms or Concerns     If patient has red-flag symptoms, warm transfer to triage line    Current symptom or concern: The pt is having a flare in his L eye    Symptoms have been present for:  24 day(s)    Has patient previously been seen for this? Yes    By : Dr. Aldana    Date: 7.7.21    Are there any new or worsening symptoms? Yes: The pt is having a flare of Uveitis in his L eye that started yesterday am. He says he is in extreme pain, had to leave work yest and won't go in today, he is tearing a lot and the eye is Red. Please call the pt to discuss-he would like to get in now.Thanks.      Action Taken: Message routed to:  [facility] ([facility] eye gen    Travel Screening: Not Applicable

## 2021-10-14 NOTE — PROGRESS NOTES
CC:  Pain left eye   HPI: Kirill Butt is a  31 year old year-old patient with history of intermediate uveitis under the care of Dr Aldana. Used to previously follow Dr isarua Delgado.   In summer 2015, had cataract extraction left eye and in fall required retina surgery with oil placement.   Right eye has some sequela of inflammation but has been quite off drops.     Right knee pain, some left Achilles pain. No abdomina pain, back pain, no rashes. Intermittent back pain, sometimes associated with work. Was previously on Methotrexate and Humira but nothing in the last 3-4 years.     Works for [employer] as . No family history of eye conditions.     He presents with one day history of deep eye pain that is preventing him from doing work, sleeping, etc. Light sensitivity along with eye pain.      Laboratory Testing (Reviewed from 2016)  CMP WNL, CBC, Quantiferon, Rheumatoid Factor, CCP, ESR, CRP, Creatinine     Current eye related medications: Prednisolone 3-4x/day left eye    Additional Ocular History:   1. Cataract extraction with intraocular lens placement left eye Summer 2015  2.  History of vitrectomy left eye Fall 2015 including Traction membrane peeling and silicone oil placement  3.  Iris bombe, s/p iridotomy with subsequent corectopia  4.  Secondary corneal edema left eye  5.  No history of amblyopia    Assessment & Plan:    Retina/Uveitis Imaging:  OCT Spectralis Macula July 7, 2021  right eye: Improved media, normal contour, no fluid. Improved vs 2011  left eye: No view     B-scan ultrasound 10/14/21   left eye: Silicone oil filled with limited resolution, grossly attached. No obvious increased thickness of the sclera     Assessment/Plan:    #blind painful eye  Eye painful to touch and injected  Possible scleritis  - NLP vision on exam today with eye pain   - PO prednisone 40mg, oral NSAID  - defer silicone oil removal as an option  - PF drops q hr, atropine bid  - If persistent pain consider  plastics eval for enucleation for blind painful eye  - follow up with Dr Aldana next week    # Intermediate uveitis, bilateral    # history of Traction detachment of retina, left eye  Treated with vitrectomy in the left eye      # History of vitrectomy - Left Eye  With silicone oil fill, which is still present. There is early corneal edema peripherally without giancarlo band keratopathy.     # Iris bombe of left eye  With secluded, correctopic pupil which is inferiorly displaced     # Corneal edema, secondary, left  Partially secondary to irido-corneal touch.      # Pseudophakia of left eye  Present since 2015    PO pred 40mg, os topical PF q hr, os atorpine bid, rheumatology referral, follow up w Jovan Zapien MD  Vitreoretinal Surgery Fellow  [facility]      ~~~~~~~~~~~~~~~~~~~~~~~~~~~~~~~~~~   Complete documentation of historical and exam elements from today's encounter can be found in the full encounter summary report (not reduplicated in this progress note).  I personally obtained the chief complaint(s) and history of present illness.  I confirmed and edited as necessary the review of systems, past medical/surgical history, family history, social history, and examination findings as documented by others; and I examined the patient myself.  I personally reviewed the relevant tests, images, and reports as documented above.  I personally reviewed the ophthalmic test(s) associated with this encounter, agree with the interpretation(s) as documented by the resident/fellow, and have edited the corresponding report(s) as necessary.   I formulated and edited as necessary the assessment and plan and discussed the findings and management plan with the patient and family    Tisha Durand MD   of Ophthalmology.  Retina Service   Department of Ophthalmology and Visual Neurosciences   [facility]  Phone: [phone]   Fax: [phone]

## 2021-10-18 ENCOUNTER — OFFICE VISIT (OUTPATIENT)
Dept: OPHTHALMOLOGY | Facility: CLINIC | Age: 31
End: 2021-10-18
Attending: OPHTHALMOLOGY
Payer: COMMERCIAL

## 2021-10-18 DIAGNOSIS — H15.092: Primary | ICD-10-CM

## 2021-10-18 DIAGNOSIS — H33.42 TRACTION DETACHMENT OF RETINA, LEFT EYE: ICD-10-CM

## 2021-10-18 DIAGNOSIS — H18.232 CORNEAL EDEMA, SECONDARY, LEFT: ICD-10-CM

## 2021-10-18 DIAGNOSIS — Z79.899 HIGH RISK MEDICATION USE: ICD-10-CM

## 2021-10-18 DIAGNOSIS — H30.23 INTERMEDIATE UVEITIS OF BOTH EYES: ICD-10-CM

## 2021-10-18 DIAGNOSIS — H30.23 INTERMEDIATE UVEITIS, BILATERAL: ICD-10-CM

## 2021-10-18 PROCEDURE — 99214 OFFICE O/P EST MOD 30 MIN: CPT | Performed by: OPHTHALMOLOGY

## 2021-10-18 PROCEDURE — G0463 HOSPITAL OUTPT CLINIC VISIT: HCPCS

## 2021-10-18 RX ORDER — PREDNISOLONE ACETATE 10 MG/ML
1 SUSPENSION/ DROPS OPHTHALMIC 4 TIMES DAILY
Qty: 5 ML | Refills: 3 | Status: SHIPPED | OUTPATIENT
Start: 2021-10-18 | End: 2022-10-17

## 2021-10-18 RX ORDER — PREDNISONE 10 MG/1
TABLET ORAL
Qty: 60 TABLET | Refills: 0 | Status: SHIPPED | OUTPATIENT
Start: 2021-10-18 | End: 2022-10-17

## 2021-10-18 RX ORDER — ATROPINE SULFATE 10 MG/ML
1 SOLUTION/ DROPS OPHTHALMIC DAILY
Qty: 5 ML | Refills: 0 | Status: SHIPPED | OUTPATIENT
Start: 2021-10-18 | End: 2022-10-17

## 2021-10-18 ASSESSMENT — TONOMETRY
OD_IOP_MMHG: 15
OS_IOP_MMHG: 13
IOP_METHOD: TONOPEN

## 2021-10-18 ASSESSMENT — SLIT LAMP EXAM - LIDS: COMMENTS: NORMAL

## 2021-10-18 ASSESSMENT — CONF VISUAL FIELD
OS_INFERIOR_TEMPORAL_RESTRICTION: 1
OD_NORMAL: 1
OS_SUPERIOR_NASAL_RESTRICTION: 1
METHOD: COUNTING FINGERS
OS_INFERIOR_NASAL_RESTRICTION: 1
OS_SUPERIOR_TEMPORAL_RESTRICTION: 1

## 2021-10-18 ASSESSMENT — REFRACTION_WEARINGRX
OD_CYLINDER: +0.75
OD_SPHERE: +1.00
OS_SPHERE: PLANO
OD_AXIS: 090
OS_CYLINDER: SPHERE
SPECS_TYPE: SVL

## 2021-10-18 ASSESSMENT — EXTERNAL EXAM - RIGHT EYE: OD_EXAM: NORMAL

## 2021-10-18 ASSESSMENT — VISUAL ACUITY
OS_CC: NLP
CORRECTION_TYPE: GLASSES
OD_CC: 20/20
METHOD: SNELLEN - LINEAR

## 2021-10-18 ASSESSMENT — CUP TO DISC RATIO: OD_RATIO: 0.4

## 2021-10-18 NOTE — PATIENT INSTRUCTIONS
Continue taking the steroid drop 3-4x/day in the left eye, okay to do more if not helping.       Continue atropine once daily in the left eye       For the prednisone, take 40 mg equivalent until 10/24, then take 30 mg each AM until 11/1, then 20 mg each AM until our next visit. IF you start having more pain when you reduce the dose, go back to the dose you were at previously    We will see about getting you in with Rheumatology sooner than December

## 2021-10-18 NOTE — LETTER
10/18/2021       RE: Kirill Butt  [address] [facility] [address]     Dear Colleague,    Thank you for referring your patient, Kirill Butt, to the [facility] EYE CLIN[facility] at [facility]. Please see a copy of my visit note below.    Chief Complaint/Presenting Concern:  Uveitis follow up    Interval History of Present Ocular Illness:  Kirill Butt is a 31 year old patient who returns for follow up of his intermediate uveitis of each eye. We last saw each other in July 2021 at which time there was no uveitis in the right eye and no pain left eye. We recommended observation, consultation with Rheumatology and to return around this time.    Things were doing well until last week when he had severe pain, light sensitivity and eye watering. Kirill saw Dr. Durand last week who started oral prednisone and frequent steroid drops with Atropine drops left eye. Kirill reports things are finally feeling better. Right eye was and still is doing fine.     Interval Updates to Medical/Family/Social History:    Was able to get scheduled in Rheumatology, there was a slight delay and the visit will be scheduled in December.     Relevant Review of Systems Updates:  As above, improving without fatigue/nausea. No trouble sleeping or stomach issues.     Laboratory Testing: None recently.     Current eye related medications: Prednisone 40 mg daily, Prednisolone hourly per day left eye, Atropine 2x/day left eye.    Retina/Uveitis Imaging: None today.    Assessment:     1. Nodular scleritis, left  Improving tenderness to palpation with small nodule present superotemporally.    2. Intermediate uveitis, bilateral  Longstanding diagnosis.  Few opacities in the right eye without haze, no view left eye.    3. Traction detachment of retina, left eye  Noted previously, no view to fundus.    4. Corneal edema, secondary, left  No better on Tammie, okay to monitor without.     5. High risk  medication use  Prednisone 40 mg daily.    Plan/Recommendations:      Discussed findings with patient.  The scleritis seen in the left eye by Dr. Durand last week and improving on prednisone.  There is also mild anterior chamber inflammation.  The cause of this new scleritis is unknown but may be somewhat related to the longstanding history of inflammation.    We recommend a slow taper of prednisone as noted below and look forward to rheumatology consultation to discuss methotrexate or other therapy to reduce the likelihood of recurrence of the uveitis.    Eye pressure is normal in both eyes.    No lab testing recommended at this time.  We are awaiting consultation with rheumatology and will kindly ask if possible to move the appointment up from early December to perhaps sometime in November.    Continue prednisone 40 mg daily until 10/25/2021, then take 30 mg daily until 11/1/21, then 20 mg daily until next visit.    Continue Daily atropine once a day in the left eye.    Continue prednisolone 3-4 times a day in the left eye (more as needed) thanks.    RTC  1. Will ask Dr. Vieyra if possible to move up the clinic consultation from early December to an earlier day if possible. Patient prefers Monday 10AM-12 PM if possible    2. Sandhyaa Cancel 10/22. Reschedule to a Monday in 3-4 weeks late AM. Tonopen, dilate OD, no testing.     Physician Attestation     Attending Physician Attestation:  Complete documentation of historical and exam elements from today's encounter can be found in the full encounter summary report (not reduplicated in this progress note). I personally obtained the chief complaint(s) and history of present illness. I confirmed and edited as necessary the review of systems, past medical/surgical history, family history, social history, and examination findings as documented by others; and I examined the patient myself. I personally reviewed the relevant tests, images, and reports as documented  above. I formulated and edited as necessary the assessment and plan and discussed the findings and management plan with the patient and family members present at the time of this visit.  Kaiser Aldana M.D., Uveitis and Medical Retina, October 18, 2021       Again, thank you for allowing me to participate in the care of your patient.      Sincerely,    Kaiser Aldana MD  [facility] Dept of Ophthalmology  Uveitis and Medical Retina

## 2021-10-18 NOTE — LETTER
October 18, 2021      Re: Kirill Butt   1990    To Whom It May Concern:    This is to confirm that the above patient was seen on 10/14/2021 and again 10/18/2021.  He is improving and should be able to return to work tomorrow, 10/19/2021 unless he is not feeling well and perhaps one extra day of rest would be beneficial.     Thank you for your cooperation in this matter.  Please do not hesitate to contact me if you have any further questions.    Sincerely,      LUCIEN NUNEZ

## 2021-10-18 NOTE — PROGRESS NOTES
Chief Complaint/Presenting Concern:  Uveitis follow up    Interval History of Present Ocular Illness:  Kirill Butt is a 31 year old patient who returns for follow up of his intermediate uveitis of each eye. We last saw each other in July 2021 at which time there was no uveitis in the right eye and no pain left eye. We recommended observation, consultation with Rheumatology and to return around this time.    Things were doing well until last week when he had severe pain, light sensitivity and eye watering. Kirill saw Dr. Durand last week who started oral prednisone and frequent steroid drops with Atropine drops left eye. Kirill reports things are finally feeling better. Right eye was and still is doing fine.     Interval Updates to Medical/Family/Social History:    Was able to get scheduled in Rheumatology, there was a slight delay and the visit will be scheduled in December.     Relevant Review of Systems Updates:  As above, improving without fatigue/nausea. No trouble sleeping or stomach issues.     Laboratory Testing: None recently     Current eye related medications: Prednisone 40 mg daily, Prednisolone hourly per day left eye, Atropine 2x/day left eye     Retina/Uveitis Imaging: None today    Assessment:     1. Nodular scleritis, left  Improving tenderness to palpation with small nodule present superotemporally    2. Intermediate uveitis, bilateral  Longstanding diagnosis.  Few opacities in the right eye without haze, no view left eye    3. Traction detachment of retina, left eye  Noted previously, no view to fundus    4. Corneal edema, secondary, left  No better on Tammie, okay to monitor without.     5. High risk medication use  Prednisone 40 mg daily    Plan/Recommendations:      Discussed findings with patient.  The scleritis seen in the left eye by Dr. Durand last week and improving on prednisone.  There is also mild anterior chamber inflammation.  The cause of this new scleritis is unknown but may be  somewhat related to the longstanding history of inflammation.    We recommend a slow taper of prednisone as noted below and look forward to rheumatology consultation to discuss methotrexate or other therapy to reduce the likelihood of recurrence of the uveitis    Eye pressure is normal in both eyes    No lab testing recommended at this time.  We are awaiting consultation with rheumatology and will kindly ask if possible to move the appointment up from early December to perhaps sometime in November    Continue prednisone 40 mg daily until 10/25/2021, then take 30 mg daily until 11/1/21, then 20 mg daily until next visit    Continue Daily atropine once a day in the left eye    Continue prednisolone 3-4 times a day in the left eye (more as needed). Recommend call/my chart message if there is recurrence on less medicine.    RTC  1. Will ask Dr. Vieyra if possible to move up the clinic consultation from early December to an earlier day if possible. Patient prefers Monday 10AM-12 PM if possible    2. Jovan Cancel 10/22. Reschedule to a Monday in 3-4 weeks late AM. Tonopen, dilate OD, no testing.     Physician Attestation     Attending Physician Attestation:  Complete documentation of historical and exam elements from today's encounter can be found in the full encounter summary report (not reduplicated in this progress note). I personally obtained the chief complaint(s) and history of present illness. I confirmed and edited as necessary the review of systems, past medical/surgical history, family history, social history, and examination findings as documented by others; and I examined the patient myself. I personally reviewed the relevant tests, images, and reports as documented above. I formulated and edited as necessary the assessment and plan and discussed the findings and management plan with the patient and family members present at the time of this visit.  Kaiser Aldana M.D., Uveitis and Medical Retina,  October 18, 2021

## 2021-10-18 NOTE — NURSING NOTE
Chief Complaints and History of Present Illnesses   Patient presents with     Uveitis Follow-Up     Chief Complaint(s) and History of Present Illness(es)     Uveitis Follow-Up     Laterality: both eyes    Onset: gradual    Onset: months ago    Quality: States the va is better      Severity: moderate    Frequency: intermittently    Associated symptoms: eye pain (has decreased) and photophobia (has decreased).  Negative for floaters and flashes    Pain scale: 0/10              Comments     Here for Intermediate uveitis, bilateral   Prednisolone  Hourly left eye   Atropine BID left eye   Pred 2 tab daily  Angeles Monsalve COT 1:38 PM October 18, 2021

## 2021-11-15 ENCOUNTER — LAB (OUTPATIENT)
Dept: LAB | Facility: CLINIC | Age: 31
End: 2021-11-15
Payer: COMMERCIAL

## 2021-11-15 ENCOUNTER — OFFICE VISIT (OUTPATIENT)
Dept: RHEUMATOLOGY | Facility: CLINIC | Age: 31
End: 2021-11-15
Attending: OPHTHALMOLOGY
Payer: COMMERCIAL

## 2021-11-15 VITALS
RESPIRATION RATE: 18 BRPM | WEIGHT: 182.8 LBS | HEIGHT: 67 IN | BODY MASS INDEX: 28.69 KG/M2 | OXYGEN SATURATION: 96 % | SYSTOLIC BLOOD PRESSURE: 130 MMHG | TEMPERATURE: 98.1 F | HEART RATE: 90 BPM | DIASTOLIC BLOOD PRESSURE: 94 MMHG

## 2021-11-15 DIAGNOSIS — H57.12 BLIND PAINFUL LEFT EYE: ICD-10-CM

## 2021-11-15 DIAGNOSIS — H20.9 UVEITIS: ICD-10-CM

## 2021-11-15 DIAGNOSIS — Z11.59 ENCOUNTER FOR SCREENING FOR VIRAL DISEASE: ICD-10-CM

## 2021-11-15 DIAGNOSIS — M47.819 SERONEGATIVE SPONDYLOARTHROPATHY: ICD-10-CM

## 2021-11-15 DIAGNOSIS — M47.819 SERONEGATIVE SPONDYLOARTHROPATHY: Primary | ICD-10-CM

## 2021-11-15 DIAGNOSIS — H54.40 BLIND PAINFUL LEFT EYE: ICD-10-CM

## 2021-11-15 LAB
ALBUMIN SERPL-MCNC: 3.9 G/DL (ref 3.4–5)
ALP SERPL-CCNC: 90 U/L (ref 40–150)
ALT SERPL W P-5'-P-CCNC: 34 U/L (ref 0–70)
ANION GAP SERPL CALCULATED.3IONS-SCNC: 8 MMOL/L (ref 3–14)
AST SERPL W P-5'-P-CCNC: 9 U/L (ref 0–45)
BASOPHILS # BLD AUTO: 0 10E3/UL (ref 0–0.2)
BASOPHILS NFR BLD AUTO: 0 %
BILIRUB SERPL-MCNC: 0.3 MG/DL (ref 0.2–1.3)
BUN SERPL-MCNC: 11 MG/DL (ref 7–30)
CALCIUM SERPL-MCNC: 9.6 MG/DL (ref 8.5–10.1)
CHLORIDE BLD-SCNC: 102 MMOL/L (ref 94–109)
CO2 SERPL-SCNC: 29 MMOL/L (ref 20–32)
CREAT SERPL-MCNC: 0.97 MG/DL (ref 0.66–1.25)
CRP SERPL-MCNC: 22.1 MG/L (ref 0–8)
EOSINOPHIL # BLD AUTO: 0.3 10E3/UL (ref 0–0.7)
EOSINOPHIL NFR BLD AUTO: 4 %
ERYTHROCYTE [DISTWIDTH] IN BLOOD BY AUTOMATED COUNT: 14.5 % (ref 10–15)
ERYTHROCYTE [SEDIMENTATION RATE] IN BLOOD BY WESTERGREN METHOD: 9 MM/HR (ref 0–15)
GFR SERPL CREATININE-BSD FRML MDRD: >90 ML/MIN/1.73M2
GLUCOSE BLD-MCNC: 103 MG/DL (ref 70–99)
HBV CORE AB SERPL QL IA: NONREACTIVE
HBV SURFACE AG SERPL QL IA: NONREACTIVE
HCT VFR BLD AUTO: 48.2 % (ref 40–53)
HCV AB SERPL QL IA: NONREACTIVE
HGB BLD-MCNC: 15.2 G/DL (ref 13.3–17.7)
HIV 1+2 AB+HIV1 P24 AG SERPL QL IA: NONREACTIVE
IMM GRANULOCYTES # BLD: 0 10E3/UL
IMM GRANULOCYTES NFR BLD: 0 %
LYMPHOCYTES # BLD AUTO: 3.7 10E3/UL (ref 0.8–5.3)
LYMPHOCYTES NFR BLD AUTO: 41 %
MCH RBC QN AUTO: 26.2 PG (ref 26.5–33)
MCHC RBC AUTO-ENTMCNC: 31.5 G/DL (ref 31.5–36.5)
MCV RBC AUTO: 83 FL (ref 78–100)
MONOCYTES # BLD AUTO: 1 10E3/UL (ref 0–1.3)
MONOCYTES NFR BLD AUTO: 11 %
NEUTROPHILS # BLD AUTO: 4 10E3/UL (ref 1.6–8.3)
NEUTROPHILS NFR BLD AUTO: 44 %
NRBC # BLD AUTO: 0 10E3/UL
NRBC BLD AUTO-RTO: 0 /100
PLATELET # BLD AUTO: 221 10E3/UL (ref 150–450)
POTASSIUM BLD-SCNC: 4 MMOL/L (ref 3.4–5.3)
PROT SERPL-MCNC: 8.3 G/DL (ref 6.8–8.8)
RBC # BLD AUTO: 5.81 10E6/UL (ref 4.4–5.9)
SODIUM SERPL-SCNC: 139 MMOL/L (ref 133–144)
WBC # BLD AUTO: 9.1 10E3/UL (ref 4–11)

## 2021-11-15 PROCEDURE — 99204 OFFICE O/P NEW MOD 45 MIN: CPT | Performed by: INTERNAL MEDICINE

## 2021-11-15 PROCEDURE — 86704 HEP B CORE ANTIBODY TOTAL: CPT | Mod: 90 | Performed by: PATHOLOGY

## 2021-11-15 PROCEDURE — 85652 RBC SED RATE AUTOMATED: CPT | Performed by: PATHOLOGY

## 2021-11-15 PROCEDURE — 87340 HEPATITIS B SURFACE AG IA: CPT | Mod: 90 | Performed by: PATHOLOGY

## 2021-11-15 PROCEDURE — 85025 COMPLETE CBC W/AUTO DIFF WBC: CPT | Performed by: PATHOLOGY

## 2021-11-15 PROCEDURE — 80053 COMPREHEN METABOLIC PANEL: CPT | Performed by: PATHOLOGY

## 2021-11-15 PROCEDURE — 86803 HEPATITIS C AB TEST: CPT | Mod: 90 | Performed by: PATHOLOGY

## 2021-11-15 PROCEDURE — 36415 COLL VENOUS BLD VENIPUNCTURE: CPT | Performed by: PATHOLOGY

## 2021-11-15 PROCEDURE — G0463 HOSPITAL OUTPT CLINIC VISIT: HCPCS

## 2021-11-15 PROCEDURE — 87389 HIV-1 AG W/HIV-1&-2 AB AG IA: CPT | Mod: 90 | Performed by: PATHOLOGY

## 2021-11-15 PROCEDURE — 86140 C-REACTIVE PROTEIN: CPT | Performed by: PATHOLOGY

## 2021-11-15 PROCEDURE — 86481 TB AG RESPONSE T-CELL SUSP: CPT | Mod: 90 | Performed by: PATHOLOGY

## 2021-11-15 PROCEDURE — 99000 SPECIMEN HANDLING OFFICE-LAB: CPT | Performed by: PATHOLOGY

## 2021-11-15 ASSESSMENT — PAIN SCALES - GENERAL: PAINLEVEL: NO PAIN (0)

## 2021-11-15 ASSESSMENT — MIFFLIN-ST. JEOR: SCORE: 1737.93

## 2021-11-15 NOTE — PROGRESS NOTES
Outpatient Rheumatology Consultation  In Person    Name: Kirill Butt    MRN [id number]   Today's date: 11/15/2021         Reason for consult: Evaluation for systemic immunosupression in setting of bilateral intermediate bilateral uveitis   Requesting physician: Kaiser Aldana MD            Assessment & Plan:   #Bilateral intermediate uveitis  #Left nodular scleritis  #Left corneal edema  #long term current systemic steroid (prednisone) use  #HLA-B27 negative  #Achilles tendonitis  #large joint recurrent inflammatory arthritis (knee)  #prior Methotrexate and Humira use  #prior knee aspiration showing elevated WBC count and CPPD crystals  #Small erosion along 3rd MCP head on hand xray from 2016. Bilateral foot films from that time are unremarkable/ no evidence of erosive disease  #RF and CCP negative in 2016    31-year-old male with a history of bilateral intermediate uveitis, left-sided nodular scleritis, traction detachment of the left retina, left corneal edema, who follows closely with ophthalmology providers and treated with both topical and systemic steroids for recurrent disease is referred to rheumatology by Zia Aldana and Rupali for initiation of systemic steroid sparing therapy.  The patient was previously followed by rheumatology most recently seen in 2017 by Dr. Butler for spondyloarthropathy  /ankylosing spondylitis thought to be most consistent with psoriatic arthritis with iritis and managed on Humira.  At that time was having recurrent inflammatory eye disease with inflammatory arthritis of the right knee.  His disease had been ongoing since age 7 and had required multiple joint aspirations.  There had also been concern for reactive arthritis component and he had had multiple STD checks throughout the year, though these have always been negative. He has had achilles involvement.     We have discussed the risks/benefits of steroid sparing therapy with his ongoing inflammatory eye disease  requiring multiple courses of prednisone, inflammatory arthritis (large joint- knee mainly), and enthesitis (achilles). Of note, the patient had previously been treated with methotrexate, though this was not sufficient in controlling his inflammatory disease. Discussed biologic and non-biologic DMARD therapy at this time, though given his tendon disease and severe hx of occular disease, we will proceed with TNF-I humira as first choice. Will obtained infectious serologies to include Hep B/C/HIV/quant gold today. Will also obtain baseline CBC, CMP, ESR, CRP.  Once these have returned, will order citrate free humira 40mg q2 weeks.     Risks and benefits of this therapy discussed, to include infection, demyelinating disease, malignancy, bowel perforation among others. He is agreeable given benefit to his underlying autoimmune inflammatory disease.     Will plan to follow up with Mr Butt in about 3 months to ensure he is tolerating this therapy without issues. At that time can space his visits out if tolerating well. Will need to have labs q6 months while on Humira. He understands that he will need to continue following closely with ophthalmology for occular exams/steroid taper for his eye disease.     PLAN:  1) Labs  2) once these return, will place order for Humira 40mg q2 weeks  3) Follow-up in about 3 months to ensure tolerating without issues, then can space out  4) At follow-up, may consider repeating bilateral hand/wrist films given question of erosion in 2016    Palmer Vieyra MD  Rheumatology     I spent a total of 45 minutes on the date of service on chart review, patient encounter, , documentation.      Subjective:   31 year old male with history of inflammatory eye disease in the setting of plantar fasciitis, achilles tendonitis, recurrent inflammatory arthritis/synovitis of bilateral wrists and knee. Reports a long history of inflammatory eye disease starting around age 7. Since that time has  also developed recurrent inflammatory arthritis of bilateral wrists, right>left knee, achilles tendon swelling/pain, plantar fasciitis. Has been on recurrent courses of topical occular steroids along with systemic prednisone. He had followed with rheumatology for his extra occular inflammatory disease, last seen by Dr. Rodriguez in 2016. He had been on prednisone and methotrexate early in his course, though this was not sufficient in controlling his symptoms, and so he was started on humira. This was continued until he had a change in jobs and lost insurance coverage and so unable to pay for the drug. While on humira, his disease was well controlled and he reports quiet eye/ inflammatory arthritis/tendon disease. While off of DMARD therapy, has continued to have multiple episodes each year (4+) of right knee, left>right achilles, bilateral wrist inflammatory arthritis/ tenosynovitis/ tendonopathy. Denies new progressive HA. No rash, psoriatic or other. No nail disease. Eye disease above. No dry mouth. No photosensitive rash. No recurrent epistaxis. No coughing up blood. No recurrent PNA/sinusitis. No chest pain/SOB/cough. No abdominal pain. No n/v/d. No dysphagia. No symptoms consistent with dactylitis. Continues to have right >left knee pain/swelling which will last for 10-14 weeks at a time. Painful. Stiff. Also with intermittent wrist swelling/stiffness/pain. These symptoms occur once ever 2 months or so. No recurrent infections. No fever, chills, unintentional weight changes. No night sweats. No muscle pain/weakness. Has dry sensation in eyes, chronic. Left>right. ROS otherwise negative.     Past Medical History  Past Medical History:   Diagnosis Date     Ankylosing spondylitis (H)      Arthritis 10/3/2012     CARDIOVASCULAR SCREENING; LDL GOAL LESS THAN 160 9/11/2012     Cataract, mod, os 1/15/2015     Inflammatory spondylopathy (H) 2/1/2013     Intermediate uveitis      Iritis      Tear meniscus knee 10/17/2014  "    Traction detachment of retina, left eye        Past Surgical History  Past Surgical History:   Procedure Laterality Date     ARTHROSCOPY KNEE RT/LT Right 10/2014     ARTHROSCOPY KNEE WITH MEDIAL MENISCECTOMY Right 7/7/2015    Procedure: ARTHROSCOPY KNEE WITH MEDIAL MENISCECTOMY;  Surgeon: Marquis Degroot MD;  Location: [facility]     CATARACT IOL, RT/LT Left      IRIDECTOMY Left 08/16/2017    left eye     PHACOEMULSIFICATION WITH STANDARD INTRAOCULAR LENS IMPLANT Left 6/2015     RETINAL REATTACHMENT Left      VITRECTOMY PARSPLANA Left 10/2015    retinectomy, macular TRD repair (JJ)       Medications  Current Outpatient Medications   Medication     atropine 1 % ophthalmic solution     prednisoLONE acetate (PRED FORTE) 1 % ophthalmic suspension     predniSONE (DELTASONE) 10 MG tablet     No current facility-administered medications for this visit.       Allergies   No Known Allergies    Family History:  No family history of autoimmune disease.     Social History: works in the [employer].  with kids. No smoking/drug use. Drinks 2-3 beers per week while watching sports on the weekend.        Objective:    Physical exam:  BP (!) 130/94 (BP Location: Right arm, Patient Position: Sitting, Cuff Size: Adult Regular)   Pulse 90   Temp 98.1  F (36.7  C) (Oral)   Resp 18   Ht 1.702 m (5' 7.01\")   Wt 82.9 kg (182 lb 12.8 oz)   SpO2 96%   BMI 28.62 kg/m    GEN: Sitting up unassisted NAD, pleasant  HEENT: no facial rash, left>right scleral injection, no inflammatory nose/ external ear changes  CV: RRR, no m/r/g  Pulm: CTAB, no crackles, wheezing, rhonchi  Abdomen: not distended, soft, non tender, no masses  Skin: no acute cutaneous lesions, no nail pitting  MSK: full active/passove ROM of bilateral shoulders, elbows, wrists, MCPs, PIPs, DIPs. Can make full fist without difficulty. No erythema or edema of these joints. No synovitis. Hips, knees, ankles, MTPs without synovitis. No " dactylitis.    Labs:  2018  Chlamydia PCR negative  Neisseria gonorrhea PCR negative  UA unremarkable  Treponema pallidum antibody negative  HIV negative    2017  ESR 23  CRP 16  Creatinine 1.06  CBC with WBC 15.8 hemoglobin 12.5 and platelets 195  Differential on the CBC showed increased absolute neutrophils at 13.3 remainder normal.    2016  Uric acid 6.1  Quant gold negative  Rheumatoid factor negative  CCP negative  Hep B core antibody nonreactive    2015  Hep B surface antibody reactive  Hep B surface antigen nonreactive  Hep C antibody nonreactive    2015  HLA-B27 negative    2012  CCP negative    2011  Cardiolipin antibody IgM mild elevation at 13 with upper limit of normal 12.5  ANCA negative  Angiotensin-converting enzyme normal  ANDRY negative  Lyme IgG and IgM negative  Rheumatoid factor 17 with upper limit of normal 14  Toxoplasma antibody IgG negative    Imagin2016  Bilateral hand x-ray  IMPRESSION: Again seen is a tiny erosion along the third left  metacarpal head. There may be interval development of a small erosion  in the distal right scaphoid on frontal view. Otherwise, no evidence  of inflammatory or degenerative arthritic changes appreciated. The  joint spaces are preserved. No acute bony abnormalities.    XR FOOT BILATERAL G/E 3 VW 2016 10:40 AM     HISTORY: Inflammatory spondylopathy.     COMPARISON: 2012.                                                                      IMPRESSION: No significant degenerative or inflammatory arthritic  changes are appreciated. The joint spaces are preserved. No erosions.    Narrative & Impression   MRI RIGHT KNEE WITHOUT CONTRAST 2015. 10:01 AM     HISTORY:  Pain in joint, lower leg.     TECHNIQUE:  Sagittal proton density and T2, coronal T1, and coronal  and transverse fat suppressed T2 weighted images.     FINDINGS:   Menisci:  There is a full-thickness linear vertical tear in  the  peripheral third of the posterior horn of the medial meniscus, best  appreciated on series 4 slice 21. No displaced meniscal flap is  demonstrated. The posterior horn of the lateral meniscus is slightly  diminished in size which may be related to prior partial meniscectomy.  Mild fraying is noted at the tip of the posterior horn. No lateral  meniscal tear or displaced meniscal flap is demonstrated.     Cruciate ligaments:  The anterior and posterior cruciate ligaments  appear within normal limits.     Collateral supporting structures:  Low signal intensity thickening of  the medial collateral ligament may be related to subacute or chronic  sprain. The fibular collateral ligament, biceps femoris and popliteal  tendons, and iliotibial tract appear intact.     Osseous structures and cartilaginous surfaces:  Full-thickness  chondral fissuring is noted along the medial facet of the patella.  Near full-thickness articular cartilage thinning is noted in the  superior pole of the patella at the apex and medial facet. Chondral  surfaces in the medial and lateral compartments appear grossly intact.  Marrow signal is within normal limits.     Additional findings:  There is a moderate joint effusion and small  popliteal cyst. Low signal intensity and mild synovial thickening is  noted which may be related to prior hemarthrosis. The quadriceps and  infrapatellar tendons appear within normal limits. Tibial collateral  ligament semimembranosus bursal fluid is also noted.     IMPRESSION  IMPRESSION:    1. Medial meniscus posterior horn vertical linear intermediate signal  extending from the superior to the inferior articular surface in the  peripheral third. This is suggestive of a vertical full-thickness  tear, age indeterminate. No fluid intravasation is noted into the  meniscal substance. No displaced meniscal flap.  2. Lateral meniscus posterior horn diminished size, likely related to  prior partial meniscectomy. Mild  degenerative fraying is noted at the  tip of the posterior horn. There is no other evidence of lateral  meniscus intrasubstance tear.  3. Patellar superior pole Grade III-IV chondromalacia.  4. Moderate joint effusion and small popliteal cyst. Low signal  intensity rind of thickened synovium surrounding the joint effusion  may be related to prior hemarthrosis.  5. Tibial collateral ligament semimembranosus bursitis along the  posteromedial aspect of the knee.     Exam: SACROILIAC JOINT 12/28/2012 2:01 PM       Comparison: None       History: Spondylosis.       Findings: No fracture or dislocation. No significant sclerosis or   erosion at the sacroiliac joints. The soft tissues are unremarkable.       Impression   Impression: No evidence of sacroiliitis.

## 2021-11-15 NOTE — NURSING NOTE
"Chief Complaint   Patient presents with     Consult     History of uveitis, having eye pain     Vital signs:  Temp: 98.1  F (36.7  C) Temp src: Oral BP: (!) 130/94 Pulse: 90   Resp: 18 SpO2: 96 %     Height: 170.2 cm (5' 7.01\") Weight: 82.9 kg (182 lb 12.8 oz)  Estimated body mass index is 28.62 kg/m  as calculated from the following:    Height as of this encounter: 1.702 m (5' 7.01\").    Weight as of this encounter: 82.9 kg (182 lb 12.8 oz).      Annemarie Mercado, [facility]  11/15/2021 7:12 AM      "

## 2021-11-15 NOTE — LETTER
11/15/2021       RE: Kirill uBtt  [address] [facility] [address]     Dear Colleague,    Thank you for referring your patient, Kirill Butt, to the [facility]. Please see a copy of my visit note below.      Outpatient Rheumatology Consultation  In Person    Name: Kirill Butt    MRN [id number]   Today's date: 11/15/2021         Reason for consult: Evaluation for systemic immunosupression in setting of bilateral intermediate bilateral uveitis   Requesting physician: Kaiser Aldana MD            Assessment & Plan:   #Bilateral intermediate uveitis  #Left nodular scleritis  #Left corneal edema  #long term current systemic steroid (prednisone) use  #HLA-B27 negative  #Achilles tendonitis  #large joint recurrent inflammatory arthritis (knee)  #prior Methotrexate and Humira use  #prior knee aspiration showing elevated WBC count and CPPD crystals  #Small erosion along 3rd MCP head on hand xray from 2016. Bilateral foot films from that time are unremarkable/ no evidence of erosive disease  #RF and CCP negative in 2016    31-year-old male with a history of bilateral intermediate uveitis, left-sided nodular scleritis, traction detachment of the left retina, left corneal edema, who follows closely with ophthalmology providers and treated with both topical and systemic steroids for recurrent disease is referred to rheumatology by Zia Aldana and Rupali for initiation of systemic steroid sparing therapy.  The patient was previously followed by rheumatology most recently seen in 2017 by Dr. Butler for spondyloarthropathy  /ankylosing spondylitis thought to be most consistent with psoriatic arthritis with iritis and managed on Humira.  At that time was having recurrent inflammatory eye disease with inflammatory arthritis of the right knee.  His disease had been ongoing since age 7 and had required multiple joint  aspirations.  There had also been concern for reactive arthritis component and he had had multiple STD checks throughout the year, though these have always been negative. He has had achilles involvement.     We have discussed the risks/benefits of steroid sparing therapy with his ongoing inflammatory eye disease requiring multiple courses of prednisone, inflammatory arthritis (large joint- knee mainly), and enthesitis (achilles). Of note, the patient had previously been treated with methotrexate, though this was not sufficient in controlling his inflammatory disease. Discussed biologic and non-biologic DMARD therapy at this time, though given his tendon disease and severe hx of occular disease, we will proceed with TNF-I humira as first choice. Will obtained infectious serologies to include Hep B/C/HIV/quant gold today. Will also obtain baseline CBC, CMP, ESR, CRP.  Once these have returned, will order citrate free humira 40mg q2 weeks.     Risks and benefits of this therapy discussed, to include infection, demyelinating disease, malignancy, bowel perforation among others. He is agreeable given benefit to his underlying autoimmune inflammatory disease.     Will plan to follow up with Mr Butt in about 3 months to ensure he is tolerating this therapy without issues. At that time can space his visits out if tolerating well. Will need to have labs q6 months while on Humira. He understands that he will need to continue following closely with ophthalmology for occular exams/steroid taper for his eye disease.     PLAN:  1) Labs  2) once these return, will place order for Humira 40mg q2 weeks  3) Follow-up in about 3 months to ensure tolerating without issues, then can space out  4) At follow-up, may consider repeating bilateral hand/wrist films given question of erosion in 2016    Palmer Vieyra MD  Rheumatology     I spent a total of 45 minutes on the date of service on chart review, patient encounter, ,  documentation.      Subjective:   31 year old male with history of inflammatory eye disease in the setting of plantar fasciitis, achilles tendonitis, recurrent inflammatory arthritis/synovitis of bilateral wrists and knee. Reports a long history of inflammatory eye disease starting around age 7. Since that time has also developed recurrent inflammatory arthritis of bilateral wrists, right>left knee, achilles tendon swelling/pain, plantar fasciitis. Has been on recurrent courses of topical occular steroids along with systemic prednisone. He had followed with rheumatology for his extra occular inflammatory disease, last seen by Dr. Rodriguez in 2016. He had been on prednisone and methotrexate early in his course, though this was not sufficient in controlling his symptoms, and so he was started on humira. This was continued until he had a change in jobs and lost insurance coverage and so unable to pay for the drug. While on humira, his disease was well controlled and he reports quiet eye/ inflammatory arthritis/tendon disease. While off of DMARD therapy, has continued to have multiple episodes each year (4+) of right knee, left>right achilles, bilateral wrist inflammatory arthritis/ tenosynovitis/ tendonopathy. Denies new progressive HA. No rash, psoriatic or other. No nail disease. Eye disease above. No dry mouth. No photosensitive rash. No recurrent epistaxis. No coughing up blood. No recurrent PNA/sinusitis. No chest pain/SOB/cough. No abdominal pain. No n/v/d. No dysphagia. No symptoms consistent with dactylitis. Continues to have right >left knee pain/swelling which will last for 10-14 weeks at a time. Painful. Stiff. Also with intermittent wrist swelling/stiffness/pain. These symptoms occur once ever 2 months or so. No recurrent infections. No fever, chills, unintentional weight changes. No night sweats. No muscle pain/weakness. Has dry sensation in eyes, chronic. Left>right. ROS otherwise negative.     Past  "Medical History  Past Medical History:   Diagnosis Date     Ankylosing spondylitis (H)      Arthritis 10/3/2012     CARDIOVASCULAR SCREENING; LDL GOAL LESS THAN 160 9/11/2012     Cataract, mod, os 1/15/2015     Inflammatory spondylopathy (H) 2/1/2013     Intermediate uveitis      Iritis      Tear meniscus knee 10/17/2014     Traction detachment of retina, left eye        Past Surgical History  Past Surgical History:   Procedure Laterality Date     ARTHROSCOPY KNEE RT/LT Right 10/2014     ARTHROSCOPY KNEE WITH MEDIAL MENISCECTOMY Right 7/7/2015    Procedure: ARTHROSCOPY KNEE WITH MEDIAL MENISCECTOMY;  Surgeon: Marquis Degroot MD;  Location: [facility]     CATARACT IOL, RT/LT Left      IRIDECTOMY Left 08/16/2017    left eye     PHACOEMULSIFICATION WITH STANDARD INTRAOCULAR LENS IMPLANT Left 6/2015     RETINAL REATTACHMENT Left      VITRECTOMY PARSPLANA Left 10/2015    retinectomy, macular TRD repair (JJ)       Medications  Current Outpatient Medications   Medication     atropine 1 % ophthalmic solution     prednisoLONE acetate (PRED FORTE) 1 % ophthalmic suspension     predniSONE (DELTASONE) 10 MG tablet     No current facility-administered medications for this visit.       Allergies   No Known Allergies    Family History:  No family history of autoimmune disease.     Social History: works in the [employer].  with kids. No smoking/drug use. Drinks 2-3 beers per week while watching sports on the weekend.        Objective:    Physical exam:  BP (!) 130/94 (BP Location: Right arm, Patient Position: Sitting, Cuff Size: Adult Regular)   Pulse 90   Temp 98.1  F (36.7  C) (Oral)   Resp 18   Ht 1.702 m (5' 7.01\")   Wt 82.9 kg (182 lb 12.8 oz)   SpO2 96%   BMI 28.62 kg/m    GEN: Sitting up unassisted NAD, pleasant  HEENT: no facial rash, left>right scleral injection, no inflammatory nose/ external ear changes  CV: RRR, no m/r/g  Pulm: CTAB, no crackles, wheezing, rhonchi  Abdomen: not distended, soft, " non tender, no masses  Skin: no acute cutaneous lesions, no nail pitting  MSK: full active/passove ROM of bilateral shoulders, elbows, wrists, MCPs, PIPs, DIPs. Can make full fist without difficulty. No erythema or edema of these joints. No synovitis. Hips, knees, ankles, MTPs without synovitis. No dactylitis.    Labs:  2018  Chlamydia PCR negative  Neisseria gonorrhea PCR negative  UA unremarkable  Treponema pallidum antibody negative  HIV negative    2017  ESR 23  CRP 16  Creatinine 1.06  CBC with WBC 15.8 hemoglobin 12.5 and platelets 195  Differential on the CBC showed increased absolute neutrophils at 13.3 remainder normal.    2016  Uric acid 6.1  Quant gold negative  Rheumatoid factor negative  CCP negative  Hep B core antibody nonreactive    2015  Hep B surface antibody reactive  Hep B surface antigen nonreactive  Hep C antibody nonreactive    2015  HLA-B27 negative    2012  CCP negative    2011  Cardiolipin antibody IgM mild elevation at 13 with upper limit of normal 12.5  ANCA negative  Angiotensin-converting enzyme normal  ANDRY negative  Lyme IgG and IgM negative  Rheumatoid factor 17 with upper limit of normal 14  Toxoplasma antibody IgG negative    Imagin2016  Bilateral hand x-ray  IMPRESSION: Again seen is a tiny erosion along the third left  metacarpal head. There may be interval development of a small erosion  in the distal right scaphoid on frontal view. Otherwise, no evidence  of inflammatory or degenerative arthritic changes appreciated. The  joint spaces are preserved. No acute bony abnormalities.    XR FOOT BILATERAL G/E 3 VW 2016 10:40 AM     HISTORY: Inflammatory spondylopathy.     COMPARISON: 2012.                                                                      IMPRESSION: No significant degenerative or inflammatory arthritic  changes are appreciated. The joint spaces are preserved. No erosions.    Narrative & Impression   MRI  RIGHT KNEE WITHOUT CONTRAST 4/6/2015. 10:01 AM     HISTORY:  Pain in joint, lower leg.     TECHNIQUE:  Sagittal proton density and T2, coronal T1, and coronal  and transverse fat suppressed T2 weighted images.     FINDINGS:   Menisci:  There is a full-thickness linear vertical tear in the  peripheral third of the posterior horn of the medial meniscus, best  appreciated on series 4 slice 21. No displaced meniscal flap is  demonstrated. The posterior horn of the lateral meniscus is slightly  diminished in size which may be related to prior partial meniscectomy.  Mild fraying is noted at the tip of the posterior horn. No lateral  meniscal tear or displaced meniscal flap is demonstrated.     Cruciate ligaments:  The anterior and posterior cruciate ligaments  appear within normal limits.     Collateral supporting structures:  Low signal intensity thickening of  the medial collateral ligament may be related to subacute or chronic  sprain. The fibular collateral ligament, biceps femoris and popliteal  tendons, and iliotibial tract appear intact.     Osseous structures and cartilaginous surfaces:  Full-thickness  chondral fissuring is noted along the medial facet of the patella.  Near full-thickness articular cartilage thinning is noted in the  superior pole of the patella at the apex and medial facet. Chondral  surfaces in the medial and lateral compartments appear grossly intact.  Marrow signal is within normal limits.     Additional findings:  There is a moderate joint effusion and small  popliteal cyst. Low signal intensity and mild synovial thickening is  noted which may be related to prior hemarthrosis. The quadriceps and  infrapatellar tendons appear within normal limits. Tibial collateral  ligament semimembranosus bursal fluid is also noted.     IMPRESSION  IMPRESSION:    1. Medial meniscus posterior horn vertical linear intermediate signal  extending from the superior to the inferior articular surface in  the  peripheral third. This is suggestive of a vertical full-thickness  tear, age indeterminate. No fluid intravasation is noted into the  meniscal substance. No displaced meniscal flap.  2. Lateral meniscus posterior horn diminished size, likely related to  prior partial meniscectomy. Mild degenerative fraying is noted at the  tip of the posterior horn. There is no other evidence of lateral  meniscus intrasubstance tear.  3. Patellar superior pole Grade III-IV chondromalacia.  4. Moderate joint effusion and small popliteal cyst. Low signal  intensity rind of thickened synovium surrounding the joint effusion  may be related to prior hemarthrosis.  5. Tibial collateral ligament semimembranosus bursitis along the  posteromedial aspect of the knee.     Exam: SACROILIAC JOINT 12/28/2012 2:01 PM       Comparison: None       History: Spondylosis.       Findings: No fracture or dislocation. No significant sclerosis or   erosion at the sacroiliac joints. The soft tissues are unremarkable.       Impression   Impression: No evidence of sacroiliitis.

## 2021-11-15 NOTE — LETTER
[facility] RHEUMATOLOG[facility]IN[facility] [address] [address]  [phone]    November 15, 2021    RE:  Kirill Butt                                                                                                                                                       [address] [place] [id number]    To whom it may concern:    Kirill Butt is under my professional care at the [facility]. He required absence from work on 11/12/21 and 11/13/21.    Please let me know if you have any questions     Sincerely,          Palmer Vieyra MD  Staff Rheumatologist  [facility] [employer]

## 2021-11-15 NOTE — LETTER
11/15/2021      RE: Kirill Butt  [address] [facility] [address]         Outpatient Rheumatology Consultation  In Person    Name: Kirill Butt    MRN [id number]   Today's date: 11/15/2021         Reason for consult: Evaluation for systemic immunosupression in setting of bilateral intermediate bilateral uveitis   Requesting physician: Kaiser Aldana MD            Assessment & Plan:   #Bilateral intermediate uveitis  #Left nodular scleritis  #Left corneal edema  #long term current systemic steroid (prednisone) use  #HLA-B27 negative  #Achilles tendonitis  #large joint recurrent inflammatory arthritis (knee)  #prior Methotrexate and Humira use  #prior knee aspiration showing elevated WBC count and CPPD crystals  #Small erosion along 3rd MCP head on hand xray from 2016. Bilateral foot films from that time are unremarkable/ no evidence of erosive disease  #RF and CCP negative in 2016    31-year-old male with a history of bilateral intermediate uveitis, left-sided nodular scleritis, traction detachment of the left retina, left corneal edema, who follows closely with ophthalmology providers and treated with both topical and systemic steroids for recurrent disease is referred to rheumatology by Zia Aldana and Rupali for initiation of systemic steroid sparing therapy.  The patient was previously followed by rheumatology most recently seen in 2017 by Dr. Butler for spondyloarthropathy  /ankylosing spondylitis thought to be most consistent with psoriatic arthritis with iritis and managed on Humira.  At that time was having recurrent inflammatory eye disease with inflammatory arthritis of the right knee.  His disease had been ongoing since age 7 and had required multiple joint aspirations.  There had also been concern for reactive arthritis component and he had had multiple STD checks throughout the year, though these have always been negative. He has had achilles involvement.     We have discussed the  risks/benefits of steroid sparing therapy with his ongoing inflammatory eye disease requiring multiple courses of prednisone, inflammatory arthritis (large joint- knee mainly), and enthesitis (achilles). Of note, the patient had previously been treated with methotrexate, though this was not sufficient in controlling his inflammatory disease. Discussed biologic and non-biologic DMARD therapy at this time, though given his tendon disease and severe hx of occular disease, we will proceed with TNF-I humira as first choice. Will obtained infectious serologies to include Hep B/C/HIV/quant gold today. Will also obtain baseline CBC, CMP, ESR, CRP.  Once these have returned, will order citrate free humira 40mg q2 weeks.     Risks and benefits of this therapy discussed, to include infection, demyelinating disease, malignancy, bowel perforation among others. He is agreeable given benefit to his underlying autoimmune inflammatory disease.     Will plan to follow up with Mr Butt in about 3 months to ensure he is tolerating this therapy without issues. At that time can space his visits out if tolerating well. Will need to have labs q6 months while on Humira. He understands that he will need to continue following closely with ophthalmology for occular exams/steroid taper for his eye disease.     PLAN:  1) Labs  2) once these return, will place order for Humira 40mg q2 weeks  3) Follow-up in about 3 months to ensure tolerating without issues, then can space out  4) At follow-up, may consider repeating bilateral hand/wrist films given question of erosion in 2016    Palmer Vieyra MD  Rheumatology     I spent a total of 45 minutes on the date of service on chart review, patient encounter, , documentation.      Subjective:   31 year old male with history of inflammatory eye disease in the setting of plantar fasciitis, achilles tendonitis, recurrent inflammatory arthritis/synovitis of bilateral wrists and knee. Reports a  long history of inflammatory eye disease starting around age 7. Since that time has also developed recurrent inflammatory arthritis of bilateral wrists, right>left knee, achilles tendon swelling/pain, plantar fasciitis. Has been on recurrent courses of topical occular steroids along with systemic prednisone. He had followed with rheumatology for his extra occular inflammatory disease, last seen by Dr. Rodriguez in 2016. He had been on prednisone and methotrexate early in his course, though this was not sufficient in controlling his symptoms, and so he was started on humira. This was continued until he had a change in jobs and lost insurance coverage and so unable to pay for the drug. While on humira, his disease was well controlled and he reports quiet eye/ inflammatory arthritis/tendon disease. While off of DMARD therapy, has continued to have multiple episodes each year (4+) of right knee, left>right achilles, bilateral wrist inflammatory arthritis/ tenosynovitis/ tendonopathy. Denies new progressive HA. No rash, psoriatic or other. No nail disease. Eye disease above. No dry mouth. No photosensitive rash. No recurrent epistaxis. No coughing up blood. No recurrent PNA/sinusitis. No chest pain/SOB/cough. No abdominal pain. No n/v/d. No dysphagia. No symptoms consistent with dactylitis. Continues to have right >left knee pain/swelling which will last for 10-14 weeks at a time. Painful. Stiff. Also with intermittent wrist swelling/stiffness/pain. These symptoms occur once ever 2 months or so. No recurrent infections. No fever, chills, unintentional weight changes. No night sweats. No muscle pain/weakness. Has dry sensation in eyes, chronic. Left>right. ROS otherwise negative.     Past Medical History  Past Medical History:   Diagnosis Date     Ankylosing spondylitis (H)      Arthritis 10/3/2012     CARDIOVASCULAR SCREENING; LDL GOAL LESS THAN 160 9/11/2012     Cataract, mod, os 1/15/2015     Inflammatory spondylopathy  "(H) 2/1/2013     Intermediate uveitis      Iritis      Tear meniscus knee 10/17/2014     Traction detachment of retina, left eye        Past Surgical History  Past Surgical History:   Procedure Laterality Date     ARTHROSCOPY KNEE RT/LT Right 10/2014     ARTHROSCOPY KNEE WITH MEDIAL MENISCECTOMY Right 7/7/2015    Procedure: ARTHROSCOPY KNEE WITH MEDIAL MENISCECTOMY;  Surgeon: Marquis Degroot MD;  Location: [facility]     CATARACT IOL, RT/LT Left      IRIDECTOMY Left 08/16/2017    left eye     PHACOEMULSIFICATION WITH STANDARD INTRAOCULAR LENS IMPLANT Left 6/2015     RETINAL REATTACHMENT Left      VITRECTOMY PARSPLANA Left 10/2015    retinectomy, macular TRD repair (JJ)       Medications  Current Outpatient Medications   Medication     atropine 1 % ophthalmic solution     prednisoLONE acetate (PRED FORTE) 1 % ophthalmic suspension     predniSONE (DELTASONE) 10 MG tablet     No current facility-administered medications for this visit.       Allergies   No Known Allergies    Family History:  No family history of autoimmune disease.     Social History: works in the [employer].  with kids. No smoking/drug use. Drinks 2-3 beers per week while watching sports on the weekend.        Objective:    Physical exam:  BP (!) 130/94 (BP Location: Right arm, Patient Position: Sitting, Cuff Size: Adult Regular)   Pulse 90   Temp 98.1  F (36.7  C) (Oral)   Resp 18   Ht 1.702 m (5' 7.01\")   Wt 82.9 kg (182 lb 12.8 oz)   SpO2 96%   BMI 28.62 kg/m    GEN: Sitting up unassisted NAD, pleasant  HEENT: no facial rash, left>right scleral injection, no inflammatory nose/ external ear changes  CV: RRR, no m/r/g  Pulm: CTAB, no crackles, wheezing, rhonchi  Abdomen: not distended, soft, non tender, no masses  Skin: no acute cutaneous lesions, no nail pitting  MSK: full active/passove ROM of bilateral shoulders, elbows, wrists, MCPs, PIPs, DIPs. Can make full fist without difficulty. No erythema or edema of these joints. No " synovitis. Hips, knees, ankles, MTPs without synovitis. No dactylitis.    Labs:  2018  Chlamydia PCR negative  Neisseria gonorrhea PCR negative  UA unremarkable  Treponema pallidum antibody negative  HIV negative    2017  ESR 23  CRP 16  Creatinine 1.06  CBC with WBC 15.8 hemoglobin 12.5 and platelets 195  Differential on the CBC showed increased absolute neutrophils at 13.3 remainder normal.    2016  Uric acid 6.1  Quant gold negative  Rheumatoid factor negative  CCP negative  Hep B core antibody nonreactive    2015  Hep B surface antibody reactive  Hep B surface antigen nonreactive  Hep C antibody nonreactive    2015  HLA-B27 negative    2012  CCP negative    2011  Cardiolipin antibody IgM mild elevation at 13 with upper limit of normal 12.5  ANCA negative  Angiotensin-converting enzyme normal  ANDRY negative  Lyme IgG and IgM negative  Rheumatoid factor 17 with upper limit of normal 14  Toxoplasma antibody IgG negative    Imagin2016  Bilateral hand x-ray  IMPRESSION: Again seen is a tiny erosion along the third left  metacarpal head. There may be interval development of a small erosion  in the distal right scaphoid on frontal view. Otherwise, no evidence  of inflammatory or degenerative arthritic changes appreciated. The  joint spaces are preserved. No acute bony abnormalities.    XR FOOT BILATERAL G/E 3 VW 2016 10:40 AM     HISTORY: Inflammatory spondylopathy.     COMPARISON: 2012.                                                                      IMPRESSION: No significant degenerative or inflammatory arthritic  changes are appreciated. The joint spaces are preserved. No erosions.    Narrative & Impression   MRI RIGHT KNEE WITHOUT CONTRAST 2015. 10:01 AM     HISTORY:  Pain in joint, lower leg.     TECHNIQUE:  Sagittal proton density and T2, coronal T1, and coronal  and transverse fat suppressed T2 weighted images.     FINDINGS:   Menisci:  There  is a full-thickness linear vertical tear in the  peripheral third of the posterior horn of the medial meniscus, best  appreciated on series 4 slice 21. No displaced meniscal flap is  demonstrated. The posterior horn of the lateral meniscus is slightly  diminished in size which may be related to prior partial meniscectomy.  Mild fraying is noted at the tip of the posterior horn. No lateral  meniscal tear or displaced meniscal flap is demonstrated.     Cruciate ligaments:  The anterior and posterior cruciate ligaments  appear within normal limits.     Collateral supporting structures:  Low signal intensity thickening of  the medial collateral ligament may be related to subacute or chronic  sprain. The fibular collateral ligament, biceps femoris and popliteal  tendons, and iliotibial tract appear intact.     Osseous structures and cartilaginous surfaces:  Full-thickness  chondral fissuring is noted along the medial facet of the patella.  Near full-thickness articular cartilage thinning is noted in the  superior pole of the patella at the apex and medial facet. Chondral  surfaces in the medial and lateral compartments appear grossly intact.  Marrow signal is within normal limits.     Additional findings:  There is a moderate joint effusion and small  popliteal cyst. Low signal intensity and mild synovial thickening is  noted which may be related to prior hemarthrosis. The quadriceps and  infrapatellar tendons appear within normal limits. Tibial collateral  ligament semimembranosus bursal fluid is also noted.     IMPRESSION  IMPRESSION:    1. Medial meniscus posterior horn vertical linear intermediate signal  extending from the superior to the inferior articular surface in the  peripheral third. This is suggestive of a vertical full-thickness  tear, age indeterminate. No fluid intravasation is noted into the  meniscal substance. No displaced meniscal flap.  2. Lateral meniscus posterior horn diminished size, likely  related to  prior partial meniscectomy. Mild degenerative fraying is noted at the  tip of the posterior horn. There is no other evidence of lateral  meniscus intrasubstance tear.  3. Patellar superior pole Grade III-IV chondromalacia.  4. Moderate joint effusion and small popliteal cyst. Low signal  intensity rind of thickened synovium surrounding the joint effusion  may be related to prior hemarthrosis.  5. Tibial collateral ligament semimembranosus bursitis along the  posteromedial aspect of the knee.     Exam: SACROILIAC JOINT 12/28/2012 2:01 PM       Comparison: None       History: Spondylosis.       Findings: No fracture or dislocation. No significant sclerosis or   erosion at the sacroiliac joints. The soft tissues are unremarkable.       Impression   Impression: No evidence of sacroiliitis.            Palmer Vieyra MD

## 2021-11-16 LAB
GAMMA INTERFERON BACKGROUND BLD IA-ACNC: 0.07 IU/ML
M TB IFN-G BLD-IMP: NEGATIVE
M TB IFN-G CD4+ BCKGRND COR BLD-ACNC: 9.93 IU/ML
MITOGEN IGNF BCKGRD COR BLD-ACNC: 0.02 IU/ML
MITOGEN IGNF BCKGRD COR BLD-ACNC: 0.02 IU/ML
QUANTIFERON MITOGEN: 10 IU/ML
QUANTIFERON NIL TUBE: 0.07 IU/ML
QUANTIFERON TB1 TUBE: 0.09 IU/ML
QUANTIFERON TB2 TUBE: 0.09

## 2021-11-30 ENCOUNTER — TELEPHONE (OUTPATIENT)
Dept: RHEUMATOLOGY | Facility: CLINIC | Age: 31
End: 2021-11-30
Payer: COMMERCIAL

## 2021-11-30 NOTE — TELEPHONE ENCOUNTER
Prior Authorization Approval    Authorization Effective Date: 11/30/2021  Authorization Expiration Date: 11/30/2022  Medication: HUMIRA - APPROVED   Approved Dose/Quantity:  2 FOR 28 DAYS   Reference #:     Insurance Company: [employer] - Phone [phone] Fax [phone]  Expected CoPay: $5     CoPay Card Available: Yes    Foundation Assistance Needed:    Which Pharmacy is filling the prescription (Not needed for infusion/clinic administered): [facility] [address] [place] [address]  Pharmacy Notified: Yes  Patient Notified: Yes

## 2021-11-30 NOTE — TELEPHONE ENCOUNTER
PA Initiation    Medication: HUMIRA   Insurance Company: [employer] - Phone [phone] Fax [phone]  Pharmacy Filling the Rx: [facility] [address]  Filling Pharmacy Phone:    Filling Pharmacy Fax:    Start Date: 11/30/2021    NADIR MCGOVERN (Franco: [id number])

## 2021-12-01 ENCOUNTER — TELEPHONE (OUTPATIENT)
Dept: OPHTHALMOLOGY | Facility: CLINIC | Age: 31
End: 2021-12-01
Payer: COMMERCIAL

## 2022-01-15 ENCOUNTER — HEALTH MAINTENANCE LETTER (OUTPATIENT)
Age: 32
End: 2022-01-15

## 2022-10-17 ENCOUNTER — OFFICE VISIT (OUTPATIENT)
Dept: OPHTHALMOLOGY | Facility: CLINIC | Age: 32
End: 2022-10-17
Attending: OPHTHALMOLOGY
Payer: COMMERCIAL

## 2022-10-17 DIAGNOSIS — Z79.899 HIGH RISK MEDICATION USE: ICD-10-CM

## 2022-10-17 DIAGNOSIS — H15.092: Primary | ICD-10-CM

## 2022-10-17 DIAGNOSIS — H33.42 TRACTION DETACHMENT OF RETINA, LEFT EYE: ICD-10-CM

## 2022-10-17 DIAGNOSIS — H30.23 INTERMEDIATE UVEITIS, BILATERAL: ICD-10-CM

## 2022-10-17 DIAGNOSIS — H30.23 INTERMEDIATE UVEITIS OF BOTH EYES: ICD-10-CM

## 2022-10-17 PROCEDURE — 99214 OFFICE O/P EST MOD 30 MIN: CPT | Performed by: OPHTHALMOLOGY

## 2022-10-17 PROCEDURE — G0463 HOSPITAL OUTPT CLINIC VISIT: HCPCS | Mod: 25

## 2022-10-17 PROCEDURE — 92134 CPTRZ OPH DX IMG PST SGM RTA: CPT | Performed by: OPHTHALMOLOGY

## 2022-10-17 RX ORDER — PREDNISOLONE ACETATE 10 MG/ML
1 SUSPENSION/ DROPS OPHTHALMIC 4 TIMES DAILY
Qty: 5 ML | Refills: 4 | Status: SHIPPED | OUTPATIENT
Start: 2022-10-17 | End: 2023-01-27

## 2022-10-17 ASSESSMENT — TONOMETRY
OS_IOP_MMHG: 18
IOP_METHOD: TONOPEN
OD_IOP_MMHG: 18

## 2022-10-17 ASSESSMENT — EXTERNAL EXAM - RIGHT EYE: OD_EXAM: NORMAL

## 2022-10-17 ASSESSMENT — CUP TO DISC RATIO: OD_RATIO: 0.4

## 2022-10-17 ASSESSMENT — REFRACTION_WEARINGRX
OD_CYLINDER: +0.75
OS_CYLINDER: SPHERE
OD_AXIS: 090
OD_SPHERE: +1.00
OS_SPHERE: PLANO
SPECS_TYPE: SVL

## 2022-10-17 ASSESSMENT — SLIT LAMP EXAM - LIDS: COMMENTS: NORMAL

## 2022-10-17 ASSESSMENT — CONF VISUAL FIELD
OS_SUPERIOR_TEMPORAL_RESTRICTION: 1
OD_INFERIOR_TEMPORAL_RESTRICTION: 0
OD_INFERIOR_NASAL_RESTRICTION: 0
OD_SUPERIOR_TEMPORAL_RESTRICTION: 0
OS_INFERIOR_NASAL_RESTRICTION: 1
OS_INFERIOR_TEMPORAL_RESTRICTION: 1
OS_SUPERIOR_NASAL_RESTRICTION: 1
OD_SUPERIOR_NASAL_RESTRICTION: 0
METHOD: COUNTING FINGERS
OD_NORMAL: 1

## 2022-10-17 ASSESSMENT — VISUAL ACUITY
CORRECTION_TYPE: GLASSES
OS_CC: NLP
METHOD: SNELLEN - LINEAR
OD_CC: 20/20+2

## 2022-10-17 NOTE — LETTER
October 17, 2022      To Whom It May Concern:      Mr. Kirill Butt is a patient of the [facility]. He has a chronic eye condition for which necessitates time off for flares. This can vary from 2-3 day to 5 days when they happen.     Kirill came for an appointment today and we should be able to get his FMLA form back to you this week with this information. Thank you for your patience and let us know if you have questions.      Kaiser Aldana M.D.  Uveitis and Medical Retina  [facility] Department of Ophthalmology and Visual Neurosciences

## 2022-10-17 NOTE — LETTER
10/17/2022       RE: Kirill Butt  [address]  [facility] [address]     Dear Colleague,    Thank you for referring your patient, Kirill Butt, to the [facility] [place] at [facility]. Please see a copy of my visit note below.    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  We recommended tapering oral prednisone and continuing drops in the left eye.    Kirill did not use prednisone pills as long as this made the blood pressure go up. The atropine was not used as much due to making the eye red. He has been using the prednisolone drop which seems to help most. Sunlight makes the left eye flare especially on the upper eyelid. Now, things feel okay.    Interval Updates to Medical/Family/Social History:    Humira was recommended 1 year ago with Dr. Vieyra but given COVID in December 2021 with a long recovery, this was never started. He has considered but is not quite ready to start Humira.    Relevant Review of Systems Updates:  Kirill is working to eliminate yeast (beer), and reduce other dietary changes. Weight has not changed too much.     Laboratory Testing: CRP slightly elevated at 0.8 on July 28, 2022     Current eye related medications: No prednisone and no Humira, Prednisolone 3-4 times a day left eye when needed without need to use frequently right eye         Retina/Uveitis Imaging:   OCT Spectralis Macula October 17, 2022  right eye: Stable broad foveal contour, no fluid. No NFL thickening    Assessment:     1. Nodular scleritis, left  No nodule today, but flare symptoms intermittently    2. Intermediate uveitis, bilateral  The  right eye has no active inflammation and no macular edema by OCT    3. Traction detachment of retina, left eye  Not evaluated today.    4. High risk medication use  None currently    Plan/Recommendations:      Discussed findings with patient. The right eye has no active inflammation and no macular edema. We can continue to monitor.     The left eye is not red today but prednisolone drops help flares. We can continue drops as needed without the need for oral prednisone or atropine.     Eye pressure is normal in each eye     Recommend additional testing: None at this time.Keeping up the anti-inflammatory diet and consider Humira for the future as this might help eye and joint flares    Continue these medications: Prednisolone drops 3-4x/day when needed for flares    No atropine drop or prednisone pills needed    RTC 3-4 months tonopen, dilate right eye, no testing    Physician Attestation     Attending Physician Attestation:  Complete documentation of historical and exam elements from today's encounter can be found in the full encounter summary report (not reduplicated in this progress note). I personally obtained the chief complaint(s) and history of present illness. I confirmed and edited as necessary the review of systems, past medical/surgical history, family history, social history, and examination findings as documented by others; and I examined the patient myself. I personally reviewed the relevant tests, images, and reports as documented above. I formulated and edited as necessary the assessment and plan and discussed the findings and management plan with the patient and family members present at the time of this visit.  Kaiser Aldana M.D., Uveitis and Medical Retina, October 17, 2022     Again, thank you for allowing me to participate in the care of your patient.      Sincerely,    Kaiser Aldana MD  [facility]  Uveitis and Medical Retina

## 2022-10-17 NOTE — NURSING NOTE
"Chief Complaints and History of Present Illnesses   Patient presents with     Scleritis Follow Up     Chief Complaint(s) and History of Present Illness(es)     Scleritis Follow Up            Laterality: left eye    Onset: gradual    Onset: months ago          Comments    Here for Nodular scleritis, left  Vision has been stable since last visit.  The pain has lasted a little longer in LE.  \"Pain is throbbing and off the charts painful in LE.\"  No flashes or floaters in either eye.  Dry, red and extra tearing in RE.      Prednisone 20 mg qday   Prednisolone 3-4 times a day left eye     JOLEEN SMITH October 17, 2022 9:19 AM                   "

## 2022-10-17 NOTE — PROGRESS NOTES
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  We recommended tapering oral prednisone and continuing drops in the left eye.    iKrill did not use prednisone pills as long as this made the blood pressure go up. The atropine was not used as much due to making the eye red. He has been using the prednisolone drop which seems to help most. Sunlight makes the left eye flare especially on the upper eyelid. Now, things feel okay.    Interval Updates to Medical/Family/Social History:    Humira was recommended 1 year ago with Dr. Vieyra but given COVID in December 2021 with a long recovery, this was never started. He has considered but is not quite ready to start Humira.    Relevant Review of Systems Updates:  Kirill is working to eliminate yeast (beer), and reduce other dietary changes. Weight has not changed too much.     Laboratory Testing: CRP slightly elevated at 0.8 on July 28, 2022     Current eye related medications: No prednisone and no Humira, Prednisolone 3-4 times a day left eye when needed without need to use frequently right eye     Retina/Uveitis Imaging:   OCT Spectralis Macula October 17, 2022  right eye: Stable broad foveal contour, no fluid. No NFL thickening    Assessment:     1. Nodular scleritis, left  No nodule today, but flare symptoms intermittently    2. Intermediate uveitis, bilateral  The right eye has no active inflammation and no macular edema by OCT    3. Traction detachment of retina, left eye  Not evaluated today.    4. High risk medication use  None currently    Plan/Recommendations:      Discussed findings with patient. The right eye has no active inflammation and no macular edema. We  can continue to monitor.     The left eye is not red today but prednisolone drops help flares. We can continue drops as needed without the need for oral prednisone or atropine.     Eye pressure is normal in each eye     Recommend additional testing: None at this time.Keeping up the anti-inflammatory diet and consider Humira for the future as this might help eye and joint flares    Continue these medications: Prednisolone drops 3-4x/day when needed for flares    No atropine drop or prednisone pills needed    RTC 3-4 months tonopen, dilate right eye, no testing    Physician Attestation     Attending Physician Attestation:  Complete documentation of historical and exam elements from today's encounter can be found in the full encounter summary report (not reduplicated in this progress note). I personally obtained the chief complaint(s) and history of present illness. I confirmed and edited as necessary the review of systems, past medical/surgical history, family history, social history, and examination findings as documented by others; and I examined the patient myself. I personally reviewed the relevant tests, images, and reports as documented above. I formulated and edited as necessary the assessment and plan and discussed the findings and management plan with the patient and family members present at the time of this visit.  Kaiser Aldana M.D., Uveitis and Medical Retina, October 17, 2022

## 2022-10-17 NOTE — PATIENT INSTRUCTIONS
Keep up the anti-inflammatory diet and consider Humira for the future as this might help eye and joint flares  Continue these medications: Prednisolone drops 3-4x/day when needed for flares  No atropine drop or prednisone pills needed  Please fax the form to us and we should get it done this week.

## 2022-10-17 NOTE — PROGRESS NOTES
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  We recommended tapering oral prednisone and continuing drops in the left eye.    Interval Updates to Medical/Family/Social History:    Humira was recommended 1 year ago__    Relevant Review of Systems Updates:      Laboratory Testing: CRP slightly elevated at 0.8 on July 28, 2022     Current eye related medications: Humira 40 mg every 14 days,?,  Prednisolone 3-4 times a day left eye    Retina/Uveitis Imaging:   OCT Spectralis Macula October 17, 2022  right eye: Stable broad foveal contour, no fluid. No NFL thickening    Assessment:     1. Nodular scleritis, left  ***    2. Intermediate uveitis, bilateral  The right eye has no active inflammation and no macular edema by OCT    3. Traction detachment of retina, left eye  ***    4. High risk medication use  **    Plan/Recommendations:      Discussed findings with patient.    Eye pressure is normal in each eye     Recommend additional testing:     Continue these medications:    Add these medications:    RTC    Physician Attestation     Attending Physician Attestation:  Complete documentation of historical and exam elements from today's encounter can be found in the full encounter summary report (not reduplicated in this progress note). I personally obtained the chief complaint(s) and history of present illness. I confirmed and edited as necessary the review of systems, past medical/surgical history, family history, social history, and examination findings as documented by others; and I examined the patient myself. I personally reviewed the relevant tests, images, and reports as documented above. I formulated and edited  as necessary the assessment and plan and discussed the findings and management plan with the patient and family members present at the time of this visit.  Kaiser Aldana M.D., Uveitis and Medical Retina, October 17, 2022

## 2022-12-26 ENCOUNTER — HEALTH MAINTENANCE LETTER (OUTPATIENT)
Age: 32
End: 2022-12-26

## 2023-03-27 DIAGNOSIS — H15.092: ICD-10-CM

## 2023-03-27 RX ORDER — PREDNISOLONE ACETATE 10 MG/ML
1 SUSPENSION/ DROPS OPHTHALMIC 4 TIMES DAILY
Qty: 5 ML | Refills: 0 | Status: SHIPPED | OUTPATIENT
Start: 2023-03-27

## 2023-03-27 NOTE — TELEPHONE ENCOUNTER
prednisoLONE acetate (PRED FORTE) 1 % ophthalmic suspension  Last Written Prescription Date:   1/27/2023  Last Fill Quantity: 5,   # refills: 0  Last Office Visit :  10/17/2022  Future Office visit:  None     Kaiser Aldana MD  Ophthalmology     Plan/Recommendations:      Discussed findings with patient. The right eye has no active inflammation and no macular edema. We can continue to monitor.     The left eye is not red today but prednisolone drops help flares. We can continue drops as needed without the need for oral prednisone or atropine.     Eye pressure is normal in each eye     Recommend additional testing: None at this time.Keeping up the anti-inflammatory diet and consider Humira for the future as this might help eye and joint flares    Continue these medications: Prednisolone drops 3-4x/day when needed for flares    No atropine drop or prednisone pills needed     RTC 3-4 months tonopen, dilate right eye, no testing    Routing refill request to provider for review/approval because:  Drug not on the [facility], [facility] or [facility] refill protocol or controlled substance      Magda Wan RN  Central Triage Red Flags/Med Refills

## 2023-04-16 ENCOUNTER — HEALTH MAINTENANCE LETTER (OUTPATIENT)
Age: 33
End: 2023-04-16

## 2024-04-26 ENCOUNTER — LAB (OUTPATIENT)
Dept: LAB | Facility: CLINIC | Age: 34
End: 2024-04-26
Payer: COMMERCIAL

## 2024-04-26 ENCOUNTER — APPOINTMENT (OUTPATIENT)
Dept: LAB | Facility: CLINIC | Age: 34
End: 2024-04-26
Payer: COMMERCIAL

## 2024-04-26 DIAGNOSIS — Z01.83 ENCOUNTER FOR BLOOD TYPING: ICD-10-CM

## 2024-04-26 DIAGNOSIS — Z31.9 ENCOUNTER FOR PROCREATIVE MANAGEMENT: Primary | ICD-10-CM

## 2024-04-26 LAB
ABO/RH(D): NORMAL
SPECIMEN EXPIRATION DATE: NORMAL

## 2024-04-26 PROCEDURE — 36415 COLL VENOUS BLD VENIPUNCTURE: CPT | Performed by: PATHOLOGY

## 2024-04-26 PROCEDURE — 86900 BLOOD TYPING SEROLOGIC ABO: CPT

## 2024-06-23 ENCOUNTER — HEALTH MAINTENANCE LETTER (OUTPATIENT)
Age: 34
End: 2024-06-23

## 2025-07-12 ENCOUNTER — HEALTH MAINTENANCE LETTER (OUTPATIENT)
Age: 35
End: 2025-07-12